# Patient Record
Sex: FEMALE | Race: BLACK OR AFRICAN AMERICAN | Employment: FULL TIME | RURAL
[De-identification: names, ages, dates, MRNs, and addresses within clinical notes are randomized per-mention and may not be internally consistent; named-entity substitution may affect disease eponyms.]

---
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
## 2022-03-18 PROBLEM — A74.9 CHLAMYDIA INFECTION: Status: ACTIVE | Noted: 2019-07-17

## 2022-03-18 PROBLEM — R63.5 WEIGHT GAIN: Status: ACTIVE | Noted: 2018-05-22

## 2022-03-20 PROBLEM — L30.0 NUMMULAR DERMATITIS: Status: ACTIVE | Noted: 2020-09-25

## 2022-04-27 ENCOUNTER — OFFICE VISIT (OUTPATIENT)
Dept: FAMILY MEDICINE CLINIC | Age: 22
End: 2022-04-27
Payer: MEDICAID

## 2022-04-27 VITALS
HEIGHT: 63 IN | RESPIRATION RATE: 20 BRPM | BODY MASS INDEX: 26.93 KG/M2 | WEIGHT: 152 LBS | TEMPERATURE: 98.2 F | OXYGEN SATURATION: 99 % | HEART RATE: 77 BPM | DIASTOLIC BLOOD PRESSURE: 62 MMHG | SYSTOLIC BLOOD PRESSURE: 105 MMHG

## 2022-04-27 DIAGNOSIS — Z02.89 ENCOUNTER FOR COMPLETION OF FORM WITH PATIENT: ICD-10-CM

## 2022-04-27 DIAGNOSIS — Z00.00 ENCOUNTER FOR PHYSICAL EXAMINATION: Primary | ICD-10-CM

## 2022-04-27 DIAGNOSIS — Z01.84 IMMUNITY STATUS TESTING: ICD-10-CM

## 2022-04-27 PROCEDURE — 99213 OFFICE O/P EST LOW 20 MIN: CPT | Performed by: NURSE PRACTITIONER

## 2022-04-27 NOTE — PROGRESS NOTES
Naveen Jane is a 25 y.o. female who presents today with c/o   Chief Complaint   Patient presents with    Physical     for rcc           Assessment/ Plan:         ICD-10-CM ICD-9-CM    1. Encounter for physical examination  Z00.00 V70.9    2. Immunity status testing  Z01.84 V72.61 QUANTIFERON-TB GOLD PLUS      HEP B SURFACE AB   3. Encounter for completion of form with patient  Z02.89 V68.89      Form started. Will call patient with results and have her RTO to  the appropriate paperwork. Orders Placed This Encounter    QUANTIFERON-TB GOLD PLUS ([facility] Default)    HEP B SURFACE AB     Standing Status:   Future     Standing Expiration Date:   4/27/2023       Follow-up and Dispositions    · Return if symptoms worsen or fail to improve. Subjective:  Naveen Jane is a 25 y.o. female here for form completion. 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 Will complete the paperwork and call her once the titers result. Past Medical History:   Diagnosis Date    Allergic rhinitis     Asthma     Otitis media     Reactive airway disease     Strep throat     Strep throat          Current Outpatient Medications:     Larissia 0.1-20 mg-mcg tab, TAKE 1 TABLET BY MOUTH EVERY DAY, Disp: , Rfl:     DM/pseudoephed/acetaminophen (VICKS DAYQUIL PO), Take  by mouth., Disp: , Rfl:     mv-min/vit C/glut/lysine/hb124 (AIRBORNE, LYSINE HCL, PO), Take  by mouth., Disp: , Rfl:     cetirizine (ZYRTEC) 10 mg tablet, Take 1 Tab by mouth nightly., Disp: 90 Tab, Rfl: 0    TRI-PREVIFEM, 28, 0.18/0.215/0.25 mg-35 mcg (28) tab, TAKE 1 TABLET BY MOUTH EVERY DAY, Disp: , Rfl: 0    No Known Allergies    History reviewed. No pertinent surgical history.     Social History     Tobacco Use   Smoking Status Never Smoker   Smokeless Tobacco Former User       Social History     Socioeconomic History    Marital status: SINGLE   Tobacco Use    Smoking status: Never Smoker    Smokeless tobacco: Former User   Substance and Sexual Activity    Alcohol use: No    Drug use: No    Sexual activity: Yes     Partners: Male     Birth control/protection: Pill   Social History Narrative    ** Merged History Encounter **            Family History   Problem Relation Age of Onset    Headache Brother     Hypertension Maternal Grandfather     Cancer Other     Diabetes Other     Headache Other     Heart Disease Other        ROS:  Review of Systems   Constitutional: Negative for chills, fever and weight loss. HENT: Negative for ear discharge, ear pain and sinus pain. Eyes: Negative for blurred vision, double vision, pain and discharge. Respiratory: Negative for cough, hemoptysis, shortness of breath and wheezing. Cardiovascular: Negative for chest pain and palpitations. Gastrointestinal: Negative for abdominal pain, diarrhea, nausea and vomiting. Genitourinary: Negative for dysuria, flank pain, frequency and hematuria. Musculoskeletal: Negative for back pain, falls, joint pain and myalgias. Skin: Negative for rash. Neurological: Negative for dizziness, speech change, loss of consciousness, weakness and headaches. Endo/Heme/Allergies: Does not bruise/bleed easily. Psychiatric/Behavioral: Negative for depression, hallucinations and suicidal ideas. Objective:     Visit Vitals  /62 (BP 1 Location: Right arm)   Pulse 77   Temp 98.2 °F (36.8 °C)   Resp 20   Ht 5' 3\" (1.6 m)   Wt 152 lb (68.9 kg)   SpO2 99%   BMI 26.93 kg/m²     Body mass index is 26.93 kg/m². Physical Exam  Vitals reviewed. Constitutional:       General: She is awake. Appearance: Normal appearance. She is well-developed, well-groomed and normal weight. She is not ill-appearing. HENT:      Head: Normocephalic and atraumatic. Right Ear: External ear normal.      Left Ear: External ear normal.      Nose: Nose normal.      Mouth/Throat:      Pharynx: Oropharynx is clear.    Eyes:      Extraocular Movements: Extraocular movements intact. Conjunctiva/sclera: Conjunctivae normal.      Pupils: Pupils are equal, round, and reactive to light. Cardiovascular:      Rate and Rhythm: Normal rate and regular rhythm. Pulses: Normal pulses. Heart sounds: Normal heart sounds. No murmur heard. No friction rub. Pulmonary:      Effort: Pulmonary effort is normal.      Breath sounds: Normal breath sounds. No wheezing. Abdominal:      General: Bowel sounds are normal.      Palpations: Abdomen is soft. Tenderness: There is no abdominal tenderness. There is no guarding. Musculoskeletal:         General: Normal range of motion. Cervical back: Normal range of motion. Skin:     General: Skin is warm and dry. Capillary Refill: Capillary refill takes less than 2 seconds. Neurological:      General: No focal deficit present. Mental Status: She is alert and oriented to person, place, and time. Mental status is at baseline. Psychiatric:         Mood and Affect: Mood normal.         Behavior: Behavior normal. Behavior is cooperative. Thought Content: Thought content normal.         Judgment: Judgment normal.               No results found for this visit on 04/27/22. Verbal and written instructions (see AVS) provided. Patient expresses understanding of diagnosis and treatment plan. Follow-up and Dispositions    · Return if symptoms worsen or fail to improve. Patient has been advised to contact practice or seek care if condition persists or worsens.      Yara Moody NP

## 2022-04-28 LAB
HBV SURFACE AB SER QL: NONREACTIVE
HBV SURFACE AB SER-ACNC: 4.58 MIU/ML

## 2022-04-28 NOTE — PROGRESS NOTES
Spoke with patients mom over the phone. Patients Anti-Hbs is <10mIU/ml, her program @ [facility] (N program) requires her to repeat three dose vaccination and redraw anti-Hbs 1-2 months after the third dose. Patient will make an appointment with the office to have this completed. Yes

## 2022-05-02 LAB
GAMMA INTERFERON BACKGROUND BLD IA-ACNC: 0.01 IU/ML
M TB IFN-G BLD-IMP: NEGATIVE
M TB IFN-G CD4+ BCKGRND COR BLD-ACNC: 0.01 IU/ML
MITOGEN IGNF BLD-ACNC: >10 IU/ML
QUANTIFERON INCUBATION, QF1T: NORMAL
QUANTIFERON TB2 AG: 0.01 IU/ML
SERVICE CMNT-IMP: NORMAL

## 2022-05-04 ENCOUNTER — OFFICE VISIT (OUTPATIENT)
Dept: FAMILY MEDICINE CLINIC | Age: 22
End: 2022-05-04

## 2022-05-04 VITALS
HEART RATE: 64 BPM | TEMPERATURE: 97.7 F | RESPIRATION RATE: 18 BRPM | SYSTOLIC BLOOD PRESSURE: 124 MMHG | HEIGHT: 63 IN | BODY MASS INDEX: 26.84 KG/M2 | WEIGHT: 151.5 LBS | DIASTOLIC BLOOD PRESSURE: 75 MMHG | OXYGEN SATURATION: 99 %

## 2022-05-04 DIAGNOSIS — Z23 ENCOUNTER FOR IMMUNIZATION: Primary | ICD-10-CM

## 2022-05-04 NOTE — PROGRESS NOTES
Katarzyna Champion is a 25 y.o. female who presents today with c/o   Chief Complaint   Patient presents with    Immunization/Injection     hep b            Assessment/ Plan:         ICD-10-CM ICD-9-CM    1. Encounter for immunization  [facility] V03.89 CANCELED: HEPATITIS B VACCINE, ADULT DOSAGE, IM     Unfortunately since she has medicaid and we are not able to perform the vaccination series with our office. I have asked her to reach out to the [facility] to have the hepatitis B vaccine series completed    No orders of the defined types were placed in this encounter. Subjective:  Katarzyna Champion is a 25 y.o. female here to have her hepatitis B vaccine series. Patients Anti-Hbs is <10mIU/ml, her program @ [facility] ([facility] program) requires her to repeat three dose vaccination and redraw anti-Hbs 1-2 months after the third dose. Patient will make an appointment with the office to have this completed. She arrived today to have this completed, but unfortunately we are not able to perform her vaccination here which I was not aware of at the time I had her make the appointment. Patient Active Problem List   Diagnosis Code    Weight gain R63.5    Chlamydia infection A74.9    Nummular dermatitis L30.0       Past Medical History:   Diagnosis Date    Allergic rhinitis     Asthma     Otitis media     Reactive airway disease     Strep throat     Strep throat          Current Outpatient Medications:     Larissia 0.1-20 mg-mcg tab, TAKE 1 TABLET BY MOUTH EVERY DAY, Disp: , Rfl:     DM/pseudoephed/acetaminophen (VICKS DAYQUIL PO), Take  by mouth., Disp: , Rfl:     mv-min/vit C/glut/lysine/hb124 (AIRBORNE, LYSINE HCL, PO), Take  by mouth., Disp: , Rfl:     cetirizine (ZYRTEC) 10 mg tablet, Take 1 Tab by mouth nightly., Disp: 90 Tab, Rfl: 0    TRI-PREVIFEM, 28, 0.18/0.215/0.25 mg-35 mcg (28) tab, TAKE 1 TABLET BY MOUTH EVERY DAY, Disp: , Rfl: 0    No Known Allergies    History reviewed.  No pertinent surgical history. Social History     Tobacco Use   Smoking Status Never Smoker   Smokeless Tobacco Former User       Social History     Socioeconomic History    Marital status: SINGLE   Tobacco Use    Smoking status: Never Smoker    Smokeless tobacco: Former User   Substance and Sexual Activity    Alcohol use: No    Drug use: No    Sexual activity: Yes     Partners: Male     Birth control/protection: Pill   Social History Narrative    ** Merged History Encounter **            Family History   Problem Relation Age of Onset    Headache Brother     Hypertension Maternal Grandfather     Cancer Other     Diabetes Other     Headache Other     Heart Disease Other        ROS:  Review of Systems   Constitutional: Negative for chills, fever and weight loss. HENT: Negative for ear discharge, ear pain and sinus pain. Eyes: Negative for blurred vision, double vision, pain and discharge. Respiratory: Negative for cough, hemoptysis, shortness of breath and wheezing. Cardiovascular: Negative for chest pain and palpitations. Gastrointestinal: Negative for abdominal pain, diarrhea, nausea and vomiting. Genitourinary: Negative for dysuria, flank pain, frequency and hematuria. Musculoskeletal: Negative for back pain, falls, joint pain and myalgias. Skin: Negative for rash. Neurological: Negative for dizziness, speech change, loss of consciousness, weakness and headaches. Endo/Heme/Allergies: Does not bruise/bleed easily. Psychiatric/Behavioral: Negative for depression, hallucinations and suicidal ideas. Objective:     Visit Vitals  /75 (BP 1 Location: Left upper arm)   Pulse 64   Temp 97.7 °F (36.5 °C) (Temporal)   Resp 18   Ht 5' 3\" (1.6 m)   Wt 151 lb 8 oz (68.7 kg)   LMP 04/08/2022   SpO2 99%   BMI 26.84 kg/m²     Body mass index is 26.84 kg/m². Physical Exam  Vitals reviewed. HENT:      Head: Normocephalic.       Right Ear: External ear normal.      Left Ear: External ear normal. Nose: Nose normal.      Mouth/Throat:      Mouth: Mucous membranes are moist.   Eyes:      Pupils: Pupils are equal, round, and reactive to light. Cardiovascular:      Rate and Rhythm: Normal rate. Pulses: Normal pulses. Pulmonary:      Effort: Pulmonary effort is normal.      Breath sounds: Normal breath sounds. Abdominal:      General: Abdomen is flat. Musculoskeletal:         General: Normal range of motion. Cervical back: Normal range of motion. Skin:     General: Skin is warm and dry. Neurological:      Mental Status: She is alert and oriented to person, place, and time. Psychiatric:         Mood and Affect: Mood normal.         Behavior: Behavior normal.         Thought Content: Thought content normal.           Results for orders placed or performed in visit on 04/27/22   QUANTIFERON-TB GOLD PLUS   Result Value Ref Range    QuantiFERON Incubation Incubation performed. QuantiFERON Criteria Comment     QuantiFERON TB1 Ag 0.01 IU/mL    QuantiFERON TB2 Ag 0.01 IU/mL    QuantiFERON Nil Value 0.01 IU/mL    QuantiFERON Mitogen Value >10.00 IU/mL    QuantiFERON Plus Negative Negative   HEP B SURFACE AB   Result Value Ref Range    Hepatitis B surface Ab 4.58 mIU/mL    Hep B surface Ab Interp. NONREACTIVE NONREACTIVE         No results found for this visit on 05/04/22. Verbal and written instructions (see AVS) provided. Patient expresses understanding of diagnosis and treatment plan. Patient has been advised to contact practice or seek care if condition persists or worsens.      Mita Torres NP

## 2022-05-04 NOTE — PROGRESS NOTES
1. \"Have you been to the ER, urgent care clinic since your last visit? Hospitalized since your last visit? \" No    2. \"Have you seen or consulted any other health care providers outside of the [address] [place] [address] since your last visit? \" No     3. For patients aged 39-70: Has the patient had a colonoscopy / FIT/ Cologuard? NA - based on age      If the patient is female:    4. For patients aged 41-77: Has the patient had a mammogram within the past 2 years? NA - based on age or sex      11. For patients aged 21-65: Has the patient had a pap smear?  Yes - no Care Gap present

## 2022-07-05 ENCOUNTER — OFFICE VISIT (OUTPATIENT)
Dept: FAMILY MEDICINE CLINIC | Age: 22
End: 2022-07-05
Payer: MEDICAID

## 2022-07-05 VITALS
HEART RATE: 73 BPM | HEIGHT: 63 IN | SYSTOLIC BLOOD PRESSURE: 116 MMHG | WEIGHT: 136.6 LBS | DIASTOLIC BLOOD PRESSURE: 63 MMHG | BODY MASS INDEX: 24.2 KG/M2 | OXYGEN SATURATION: 97 % | TEMPERATURE: 98.6 F | RESPIRATION RATE: 20 BRPM

## 2022-07-05 DIAGNOSIS — L02.91 ABSCESS: Primary | ICD-10-CM

## 2022-07-05 PROCEDURE — 99213 OFFICE O/P EST LOW 20 MIN: CPT | Performed by: NURSE PRACTITIONER

## 2022-07-05 RX ORDER — AMOXICILLIN AND CLAVULANATE POTASSIUM 875; 125 MG/1; MG/1
1 TABLET, FILM COATED ORAL 2 TIMES DAILY
Qty: 20 TABLET | Refills: 0 | Status: SHIPPED | OUTPATIENT
Start: 2022-07-05 | End: 2022-07-07 | Stop reason: SINTOL

## 2022-07-05 NOTE — PATIENT INSTRUCTIONS
Perineal Abscess: Care Instructions  Your Care Instructions  A perineal abscess is an infection that causes a painful lump in the perineum. The perineum is the area between the scrotum and the anus in a man. In a woman, it's the area between the vulva and the anus. The area may look red and feel painful and be swollen. The abscess may form after surgery or after delivery of a baby. It can also be caused by an infection of the prostate gland. The prostate gland is an organ found inside a man's body, just below the bladder. Your doctor may have done minor surgery to open and drain the abscess. You may have had a sedative to help you relax. You may be unsteady after having sedation. It can take a few hours for the medicine's effects to wear off. Common side effects include nausea, vomiting, and feeling sleepy or tired. The doctor has checked you carefully, but problems can develop later. If you notice any problems or new symptoms, get medical treatment right away. Follow-up care is a key part of your treatment and safety. Be sure to make and go to all appointments, and call your doctor if you are having problems. It's also a good idea to know your test results and keep a list of the medicines you take. How can you care for yourself at home? · If your doctor gave you a sedative:  ? For 24 hours, don't do anything that requires attention to detail. This includes going to work, making important decisions, or signing any legal documents. It takes time for the medicine's effects to completely wear off.  ? For your safety, do not drive or operate any machinery that could be dangerous. Wait until the medicine wears off. You need to be able to think clearly and react easily. · Be safe with medicines. Read and follow all instructions on the label. ? If the doctor gave you a prescription medicine for pain, take it as prescribed.   ? If you are not taking a prescription pain medicine, ask your doctor if you can take an over-the-counter medicine. · If your doctor prescribed antibiotics, take them as directed. Do not stop taking them just because you feel better. You need to take the full course of antibiotics. · Sit in a few inches of warm water (sitz bath) 3 times a day and after bowel movements. The warm water helps the area heal and eases discomfort. When should you call for help? Call 911 anytime you think you may need emergency care. For example, call if:    · You have trouble breathing.     · You passed out (lost consciousness). Call your doctor now or seek immediate medical care if:    · You have symptoms of infection, such as:  ? Increased pain, swelling, warmth, or redness. ? Red streaks leading from the area. ? Pus draining from the area. ? A fever. Watch closely for changes in your health, and be sure to contact your doctor if:    · You do not get better as expected. Where can you learn more? Go to http://www.gray.com/  Enter J195 in the search box to learn more about \"Perineal Abscess: Care Instructions. \"  Current as of: September 8, 2021               Content Version: 13.2  © 7410-9604 [employer]. Care instructions adapted under license by [employer] (which disclaims liability or warranty for this information). If you have questions about a medical condition or this instruction, always ask your healthcare professional. [place] [address] any warranty or liability for your use of this information.

## 2022-07-05 NOTE — PROGRESS NOTES
Enrico Oshea is a 25 y.o. female who presents today with c/o   Chief Complaint   Patient presents with    Skin Problem     genital area       Assessment/ Plan:         ICD-10-CM ICD-9-CM    1. Abscess  L02.91 682. 9      She was unable to tolerate draining the abscess in the office today. We were not able to numb the area due to discomfort- I was unable to insert the needle to administer lidocaine. Will start her on antibiotics and have her RTO in 3 days for re-evaluation  Will add clindamycin on her follow up appt if needed    Orders Placed This Encounter    amoxicillin-clavulanate (AUGMENTIN) 875-125 mg per tablet     Sig: Take 1 Tablet by mouth two (2) times a day for 10 days. Dispense:  20 Tablet     Refill:  0       Follow-up and Dispositions    · Return in about 3 days (around 7/8/2022). Subjective:  Enrico Oshea is a 25 y.o. female here today for 'boil.' She states that over the last three days she started having pain to the left side of her perineum. Hurts to sit down and she has noticed purulent drainage in her underwear. States the drainage also has an odor. She denies fever at home. Patient Active Problem List   Diagnosis Code    Weight gain R63.5    Chlamydia infection A74.9    Nummular dermatitis L30.0       Past Medical History:   Diagnosis Date    Allergic rhinitis     Asthma     Otitis media     Reactive airway disease     Strep throat     Strep throat          Current Outpatient Medications:     amoxicillin-clavulanate (AUGMENTIN) 875-125 mg per tablet, Take 1 Tablet by mouth two (2) times a day for 10 days. , Disp: 20 Tablet, Rfl: 0    Larissia 0.1-20 mg-mcg tab, TAKE 1 TABLET BY MOUTH EVERY DAY, Disp: , Rfl:     DM/pseudoephed/acetaminophen (VICKS DAYQUIL PO), Take  by mouth., Disp: , Rfl:     mv-min/vit C/glut/lysine/hb124 (AIRBORNE, LYSINE HCL, PO), Take  by mouth., Disp: , Rfl:     cetirizine (ZYRTEC) 10 mg tablet, Take 1 Tab by mouth nightly., Disp: 90 Tab, Rfl: 0    TRI-PREVIFEM, 28, 0.18/0.215/0.25 mg-35 mcg (28) tab, TAKE 1 TABLET BY MOUTH EVERY DAY, Disp: , Rfl: 0    No Known Allergies    History reviewed. No pertinent surgical history. Social History     Tobacco Use   Smoking Status Never Smoker   Smokeless Tobacco Former User       Social History     Socioeconomic History    Marital status: SINGLE   Tobacco Use    Smoking status: Never Smoker    Smokeless tobacco: Former User   Substance and Sexual Activity    Alcohol use: No    Drug use: No    Sexual activity: Yes     Partners: Male     Birth control/protection: Pill   Social History Narrative    ** Merged History Encounter **            Family History   Problem Relation Age of Onset    Headache Brother     Hypertension Maternal Grandfather     Cancer Other     Diabetes Other     Headache Other     Heart Disease Other        ROS:  Review of Systems   Constitutional: Negative for chills, fever and malaise/fatigue. HENT: Negative for hearing loss. Eyes: Negative for blurred vision and double vision. Respiratory: Negative for cough. Cardiovascular: Negative for chest pain and palpitations. Gastrointestinal: Negative for abdominal pain, heartburn, nausea and vomiting. Genitourinary:        Abscess noted to left perineum   Musculoskeletal: Negative for myalgias. Skin: Negative for rash. Neurological: Negative for dizziness, tingling and headaches. Psychiatric/Behavioral: Negative for depression. Objective:     Visit Vitals  /63 (BP 1 Location: Left arm)   Pulse 73   Temp 98.6 °F (37 °C)   Resp 20   Ht 5' 3\" (1.6 m)   Wt 136 lb 9.6 oz (62 kg)   SpO2 97%   BMI 24.20 kg/m²     Body mass index is 24.2 kg/m². Physical Exam  Vitals reviewed. HENT:      Head: Normocephalic.       Right Ear: External ear normal.      Left Ear: External ear normal.      Nose: Nose normal.      Mouth/Throat:      Mouth: Mucous membranes are moist.   Eyes:      Extraocular Movements: Extraocular movements intact. Conjunctiva/sclera: Conjunctivae normal.      Pupils: Pupils are equal, round, and reactive to light. Cardiovascular:      Rate and Rhythm: Normal rate. Pulses: Normal pulses. Pulmonary:      Effort: Pulmonary effort is normal.   Abdominal:      General: Abdomen is flat. Genitourinary:      Musculoskeletal:         General: Normal range of motion. Cervical back: Normal range of motion. Neurological:      Mental Status: She is alert and oriented to person, place, and time. Results for orders placed or performed in visit on 04/27/22   QUANTIFERON-TB GOLD PLUS   Result Value Ref Range    QuantiFERON Incubation Incubation performed. QuantiFERON Criteria Comment     QuantiFERON TB1 Ag 0.01 IU/mL    QuantiFERON TB2 Ag 0.01 IU/mL    QuantiFERON Nil Value 0.01 IU/mL    QuantiFERON Mitogen Value >10.00 IU/mL    QuantiFERON Plus Negative Negative   HEP B SURFACE AB   Result Value Ref Range    Hepatitis B surface Ab 4.58 mIU/mL    Hep B surface Ab Interp. NONREACTIVE NONREACTIVE         No results found for this visit on 07/05/22. Verbal and written instructions (see AVS) provided. Patient expresses understanding of diagnosis and treatment plan. Follow-up and Dispositions    · Return in about 3 days (around 7/8/2022). Patient has been advised to contact practice or seek care if condition persists or worsens.      Pedrito Taylor NP

## 2022-07-05 NOTE — PROGRESS NOTES
1. Have you been to the ER, urgent care clinic since your last visit? Hospitalized since your last visit?no    2. Have you seen or consulted any other health care providers outside of the [address] [place] [address] since your last visit? Include any pap smears or colon screening.  no

## 2022-07-07 ENCOUNTER — DOCUMENTATION ONLY (OUTPATIENT)
Dept: FAMILY MEDICINE CLINIC | Age: 22
End: 2022-07-07

## 2022-07-07 ENCOUNTER — TELEPHONE (OUTPATIENT)
Dept: FAMILY MEDICINE CLINIC | Age: 22
End: 2022-07-07

## 2022-07-07 RX ORDER — CLINDAMYCIN HYDROCHLORIDE 300 MG/1
300 CAPSULE ORAL 3 TIMES DAILY
Qty: 30 CAPSULE | Refills: 0 | Status: SHIPPED | OUTPATIENT
Start: 2022-07-07 | End: 2022-07-11

## 2022-07-07 NOTE — TELEPHONE ENCOUNTER
----- Message from Nalini Urias sent at 7/7/2022  8:24 AM EDT -----  Subject: Message to Provider    QUESTIONS  Information for Provider? Pt called in and stated that pt was sick   yesterday evening and did not know if it is from the antibiotic did take   it that morning and was fine but that evening did not feel well . Pt did   not go to school today felt very uncomfortable . And was told by provider   if it did not get any better to give provider a call pt do have a   appointment on Friday but did not know what to do .  ---------------------------------------------------------------------------  --------------  [id number]  [phone]; Do not leave any message, patient will call back for answer  ---------------------------------------------------------------------------  --------------  SCRIPT ANSWERS  Relationship to Patient?  Self

## 2022-07-07 NOTE — TELEPHONE ENCOUNTER
Pt stated she didn't take the meds today. She stated last night she had cold sweats, vomiting & dizziness and wanted to talk to you about it.  Pt was made aware that you were send in  A new antibiotic

## 2022-07-07 NOTE — PROGRESS NOTES
Patient states she felt sick taking her antibiotic--will change antibiotic and have her follow up in 3 days in the office. She will go to the ER over the weekend if she develops a fever/worsening pain/chills.

## 2022-07-07 NOTE — PROGRESS NOTES
Nina Acosta is a 25 y.o. female who presents today with c/o   Chief Complaint   Patient presents with    Follow-up     boil       Assessment/ Plan:         ICD-10-CM ICD-9-CM    1. Abscess  L02.91 682.9      Continue antibiotic and follow up in the office in one week    Orders Placed This Encounter    amoxicillin-clavulanate (AUGMENTIN) 875-125 mg per tablet     Sig: Take 1 Tablet by mouth two (2) times a day for 10 days. Dispense:  20 Tablet     Refill:  0       Follow-up and Dispositions    · Return in about 1 week (around 7/18/2022). Subjective:  Nina Acosta is a 25 y.o. female here today for follow-up on her boil. Seen last week in the office and I was unable to drain the abscess so she was placed on Augmentin. Today, she is still having discomfort. She denies fevers at home. The abscess has been draining purulent drainage. She has been taking motrin for her pain which is also helping. Patient Active Problem List   Diagnosis Code    Weight gain R63.5    Chlamydia infection A74.9    Nummular dermatitis L30.0       Past Medical History:   Diagnosis Date    Allergic rhinitis     Asthma     Otitis media     Reactive airway disease     Strep throat     Strep throat          Current Outpatient Medications:     amoxicillin-clavulanate (AUGMENTIN) 875-125 mg per tablet, Take 1 Tablet by mouth two (2) times a day for 10 days. , Disp: 20 Tablet, Rfl: 0    Larissia 0.1-20 mg-mcg tab, TAKE 1 TABLET BY MOUTH EVERY DAY, Disp: , Rfl:     DM/pseudoephed/acetaminophen (VICKS DAYQUIL PO), Take  by mouth., Disp: , Rfl:     mv-min/vit C/glut/lysine/hb124 (AIRBORNE, LYSINE HCL, PO), Take  by mouth., Disp: , Rfl:     cetirizine (ZYRTEC) 10 mg tablet, Take 1 Tab by mouth nightly., Disp: 90 Tab, Rfl: 0    TRI-PREVIFEM, 28, 0.18/0.215/0.25 mg-35 mcg (28) tab, TAKE 1 TABLET BY MOUTH EVERY DAY, Disp: , Rfl: 0    No Known Allergies    History reviewed. No pertinent surgical history.     Social History     Tobacco Use   Smoking Status Never Smoker   Smokeless Tobacco Former User       Social History     Socioeconomic History    Marital status: SINGLE   Tobacco Use    Smoking status: Never Smoker    Smokeless tobacco: Former User   Substance and Sexual Activity    Alcohol use: No    Drug use: No    Sexual activity: Yes     Partners: Male     Birth control/protection: Pill   Social History Narrative    ** Merged History Encounter **            Family History   Problem Relation Age of Onset    Headache Brother     Hypertension Maternal Grandfather     Cancer Other     Diabetes Other     Headache Other     Heart Disease Other        ROS:  Review of Systems   Constitutional: Negative for chills, fever and malaise/fatigue. HENT: Negative for hearing loss. Eyes: Negative for blurred vision and double vision. Respiratory: Negative for cough. Cardiovascular: Negative for chest pain and palpitations. Gastrointestinal: Negative for abdominal pain, heartburn, nausea and vomiting. Genitourinary:        Abscess noted to left perineum   Musculoskeletal: Negative for myalgias. Skin: Negative for rash. Neurological: Negative for dizziness, tingling and headaches. Psychiatric/Behavioral: Negative for depression. Objective:     Visit Vitals  /65 (BP 1 Location: Left upper arm)   Pulse 62   Temp 97.8 °F (36.6 °C) (Temporal)   Resp 18   Ht 5' 3\" (1.6 m)   Wt 137 lb 4 oz (62.3 kg)   LMP 07/01/2022   SpO2 98%   BMI 24.31 kg/m²     Body mass index is 24.31 kg/m². Physical Exam  Vitals reviewed. HENT:      Head: Normocephalic. Right Ear: External ear normal.      Left Ear: External ear normal.      Nose: Nose normal.      Mouth/Throat:      Mouth: Mucous membranes are moist.   Eyes:      Extraocular Movements: Extraocular movements intact. Conjunctiva/sclera: Conjunctivae normal.      Pupils: Pupils are equal, round, and reactive to light.    Cardiovascular:      Rate and Rhythm: Normal rate. Pulses: Normal pulses. Pulmonary:      Effort: Pulmonary effort is normal.   Abdominal:      General: Abdomen is flat. Genitourinary:      Musculoskeletal:         General: Normal range of motion. Cervical back: Normal range of motion. Neurological:      Mental Status: She is alert and oriented to person, place, and time. Results for orders placed or performed in visit on 04/27/22   QUANTIFERON-TB GOLD PLUS   Result Value Ref Range    QuantiFERON Incubation Incubation performed. QuantiFERON Criteria Comment     QuantiFERON TB1 Ag 0.01 IU/mL    QuantiFERON TB2 Ag 0.01 IU/mL    QuantiFERON Nil Value 0.01 IU/mL    QuantiFERON Mitogen Value >10.00 IU/mL    QuantiFERON Plus Negative Negative   HEP B SURFACE AB   Result Value Ref Range    Hepatitis B surface Ab 4.58 mIU/mL    Hep B surface Ab Interp. NONREACTIVE NONREACTIVE         No results found for this visit on 07/11/22. Verbal and written instructions (see AVS) provided. Patient expresses understanding of diagnosis and treatment plan. Follow-up and Dispositions    · Return in about 1 week (around 7/18/2022). Patient has been advised to contact practice or seek care if condition persists or worsens.      Marcelle Flores NP

## 2022-07-11 ENCOUNTER — OFFICE VISIT (OUTPATIENT)
Dept: FAMILY MEDICINE CLINIC | Age: 22
End: 2022-07-11
Payer: MEDICAID

## 2022-07-11 VITALS
TEMPERATURE: 97.8 F | BODY MASS INDEX: 24.32 KG/M2 | OXYGEN SATURATION: 98 % | WEIGHT: 137.25 LBS | HEIGHT: 63 IN | HEART RATE: 62 BPM | SYSTOLIC BLOOD PRESSURE: 116 MMHG | DIASTOLIC BLOOD PRESSURE: 65 MMHG | RESPIRATION RATE: 18 BRPM

## 2022-07-11 DIAGNOSIS — L02.91 ABSCESS: Primary | ICD-10-CM

## 2022-07-11 PROCEDURE — 99213 OFFICE O/P EST LOW 20 MIN: CPT | Performed by: NURSE PRACTITIONER

## 2022-07-11 RX ORDER — AMOXICILLIN AND CLAVULANATE POTASSIUM 875; 125 MG/1; MG/1
1 TABLET, FILM COATED ORAL 2 TIMES DAILY
Qty: 20 TABLET | Refills: 0
Start: 2022-07-11 | End: 2022-07-18 | Stop reason: ALTCHOICE

## 2022-07-11 NOTE — PROGRESS NOTES
1. \"Have you been to the ER, urgent care clinic since your last visit? Hospitalized since your last visit? \" No    2. \"Have you seen or consulted any other health care providers outside of the [address] [place] [address] since your last visit? \" No     3. For patients aged 39-70: Has the patient had a colonoscopy / FIT/ Cologuard? NA - based on age      If the patient is female:    4. For patients aged 41-77: Has the patient had a mammogram within the past 2 years? NA - based on age or sex      11. For patients aged 21-65: Has the patient had a pap smear?  Yes - no Care Gap present

## 2022-07-14 NOTE — PROGRESS NOTES
Catracho Sarabia is a 25 y.o. female who presents today with c/o   Chief Complaint   Patient presents with    Cyst       Assessment/ Plan:         ICD-10-CM ICD-9-CM    1. Infected cyst of Bartholin's gland duct  N75.0 616.2    2. Yeast infection of the vagina  B37.3 112.1      [facility] with Dr. Linda Peraza for recommendations. Appointment scheduled this Thursday at [facility] for the patient. Start taking diflucan as ordered    Orders Placed This Encounter    fluconazole (DIFLUCAN) 150 mg tablet     Sig: Take 1 Tablet by mouth daily for 1 day. FDA advises cautious prescribing of oral fluconazole in pregnancy. Dispense:  1 Tablet     Refill:  0       Follow-up and Dispositions    · Return if symptoms worsen or fail to improve. Follow-up and Disposition History         Subjective:  Catracho Sarabia is a 25 y.o. female here today for follow-up on her cyst. Seen initially 7/5/22 for the same. We were unable to drain her cyst in the office and she was placed on Augmentin. Today, she is still having discomfort. The area is still draining purulent drainage. Progressively improving. She denies fevers at home. She has been keeping the area clean with soap and water daily. Yeast infection  Reports vaginal itching. States she noticed the itching after taking the antibiotic a few weeks ago. Patient Active Problem List   Diagnosis Code    Weight gain R63.5    Chlamydia infection A74.9    Nummular dermatitis L30.0       Past Medical History:   Diagnosis Date    Allergic rhinitis     Asthma     Otitis media     Reactive airway disease     Strep throat     Strep throat          Current Outpatient Medications:     fluconazole (DIFLUCAN) 150 mg tablet, Take 1 Tablet by mouth daily for 1 day. FDA advises cautious prescribing of oral fluconazole in pregnancy. , Disp: 1 Tablet, Rfl: 0    Larissia 0.1-20 mg-mcg tab, TAKE 1 TABLET BY MOUTH EVERY DAY, Disp: , Rfl:    DM/pseudoephed/acetaminophen (VICKS DAYQUIL PO), Take  by mouth., Disp: , Rfl:     mv-min/vit C/glut/lysine/hb124 (AIRBORNE, LYSINE HCL, PO), Take  by mouth., Disp: , Rfl:     cetirizine (ZYRTEC) 10 mg tablet, Take 1 Tab by mouth nightly., Disp: 90 Tab, Rfl: 0    TRI-PREVIFEM, 28, 0.18/0.215/0.25 mg-35 mcg (28) tab, TAKE 1 TABLET BY MOUTH EVERY DAY, Disp: , Rfl: 0    No Known Allergies    History reviewed. No pertinent surgical history. Social History     Tobacco Use   Smoking Status Never Smoker   Smokeless Tobacco Former User       Social History     Socioeconomic History    Marital status: SINGLE   Tobacco Use    Smoking status: Never Smoker    Smokeless tobacco: Former User   Substance and Sexual Activity    Alcohol use: No    Drug use: No    Sexual activity: Yes     Partners: Male     Birth control/protection: Pill   Social History Narrative    ** Merged History Encounter **            Family History   Problem Relation Age of Onset    Headache Brother     Hypertension Maternal Grandfather     Cancer Other     Diabetes Other     Headache Other     Heart Disease Other        ROS:  Review of Systems   Constitutional: Negative for chills, fever and malaise/fatigue. HENT: Negative for hearing loss. Eyes: Negative for blurred vision and double vision. Respiratory: Negative for cough. Cardiovascular: Negative for chest pain and palpitations. Gastrointestinal: Negative for abdominal pain, heartburn, nausea and vomiting. Genitourinary:        Cyst noted to left perineum  +vaginal itching   Musculoskeletal: Negative for myalgias. Skin: Negative for rash. Neurological: Negative for dizziness, tingling and headaches. Psychiatric/Behavioral: Negative for depression.          Objective:     Visit Vitals  /63 (BP 1 Location: Left upper arm)   Pulse 72   Temp 98 °F (36.7 °C) (Temporal)   Resp 18   Ht 5' 3\" (1.6 m)   Wt 138 lb 4 oz (62.7 kg)   LMP 07/01/2022   SpO2 98%   BMI 24.49 kg/m²     Body mass index is 24.49 kg/m². Physical Exam  Vitals reviewed. HENT:      Head: Normocephalic. Right Ear: External ear normal.      Left Ear: External ear normal.      Nose: Nose normal.      Mouth/Throat:      Mouth: Mucous membranes are moist.   Eyes:      Extraocular Movements: Extraocular movements intact. Conjunctiva/sclera: Conjunctivae normal.      Pupils: Pupils are equal, round, and reactive to light. Cardiovascular:      Rate and Rhythm: Normal rate. Pulses: Normal pulses. Pulmonary:      Effort: Pulmonary effort is normal.   Abdominal:      General: Abdomen is flat. Genitourinary:      Musculoskeletal:         General: Normal range of motion. Cervical back: Normal range of motion. Neurological:      Mental Status: She is alert and oriented to person, place, and time. No results found for this visit on 07/18/22. Verbal and written instructions (see AVS) provided. Patient expresses understanding of diagnosis and treatment plan. Follow-up and Dispositions    · Return if symptoms worsen or fail to improve. Follow-up and Disposition History            Patient has been advised to contact practice or seek care if condition persists or worsens.      Dominique Santos NP

## 2022-07-18 ENCOUNTER — OFFICE VISIT (OUTPATIENT)
Dept: FAMILY MEDICINE CLINIC | Age: 22
End: 2022-07-18
Payer: MEDICAID

## 2022-07-18 VITALS
BODY MASS INDEX: 24.5 KG/M2 | RESPIRATION RATE: 18 BRPM | TEMPERATURE: 98 F | SYSTOLIC BLOOD PRESSURE: 104 MMHG | WEIGHT: 138.25 LBS | DIASTOLIC BLOOD PRESSURE: 63 MMHG | OXYGEN SATURATION: 98 % | HEIGHT: 63 IN | HEART RATE: 72 BPM

## 2022-07-18 DIAGNOSIS — B37.31 YEAST INFECTION OF THE VAGINA: ICD-10-CM

## 2022-07-18 DIAGNOSIS — N75.0 INFECTED CYST OF BARTHOLIN'S GLAND DUCT: Primary | ICD-10-CM

## 2022-07-18 PROCEDURE — 99213 OFFICE O/P EST LOW 20 MIN: CPT | Performed by: NURSE PRACTITIONER

## 2022-07-18 RX ORDER — FLUCONAZOLE 150 MG/1
150 TABLET ORAL DAILY
Qty: 1 TABLET | Refills: 0 | Status: SHIPPED | OUTPATIENT
Start: 2022-07-18 | End: 2022-07-19

## 2022-07-18 NOTE — PROGRESS NOTES
1. \"Have you been to the ER, urgent care clinic since your last visit? Hospitalized since your last visit? \" No    2. \"Have you seen or consulted any other health care providers outside of the [address] [place] [address] since your last visit? \" No     3. For patients aged 39-70: Has the patient had a colonoscopy / FIT/ Cologuard? NA - based on age      If the patient is female:    4. For patients aged 41-77: Has the patient had a mammogram within the past 2 years? NA - based on age or sex      11. For patients aged 21-65: Has the patient had a pap smear?  Yes - no Care Gap present

## 2022-08-30 NOTE — PROGRESS NOTES
New Patient, Referred by Dr. Francy Luna for vulvar mass    1. Have you been to the ER, urgent care clinic since your last visit? Hospitalized since your last visit?  no    2. Have you seen or consulted any other health care providers outside of the [address] [place] [address] since your last visit? Include any pap smears or colon screening.    Yes, Dr. Francy Luna

## 2022-08-31 ENCOUNTER — OFFICE VISIT (OUTPATIENT)
Dept: GYNECOLOGY | Age: 22
End: 2022-08-31
Payer: MEDICAID

## 2022-08-31 VITALS
BODY MASS INDEX: 24.38 KG/M2 | WEIGHT: 137.6 LBS | HEART RATE: 55 BPM | SYSTOLIC BLOOD PRESSURE: 101 MMHG | HEIGHT: 63 IN | DIASTOLIC BLOOD PRESSURE: 58 MMHG

## 2022-08-31 DIAGNOSIS — N90.89 VULVAR LESION: Primary | ICD-10-CM

## 2022-08-31 PROCEDURE — 99204 OFFICE O/P NEW MOD 45 MIN: CPT | Performed by: OBSTETRICS & GYNECOLOGY

## 2022-08-31 RX ORDER — METRONIDAZOLE 500 MG/1
TABLET ORAL
COMMUNITY
Start: 2022-08-24 | End: 2022-10-24

## 2022-08-31 NOTE — LETTER
9/2/2022    Patient: Catracho Sarabia   YOB: 2000   Date of Visit: 8/31/2022     Belkys England NP  [address] [address]  Via In Brandie Espinoza MD  [facility] [address]  [place] [address]  Via Fax: [phone]    Dear MARIANNE Garza MD,      Thank you for referring Ms. Gabby Varela to [facility] for evaluation. My notes for this consultation are attached. If you have questions, please do not hesitate to call me. I look forward to following your patient along with you.       Sincerely,    Blaine Sierra MD

## 2022-09-02 ENCOUNTER — ANESTHESIA EVENT (OUTPATIENT)
Dept: SURGERY | Age: 22
End: 2022-09-02
Payer: MEDICAID

## 2022-09-02 ENCOUNTER — ANESTHESIA (OUTPATIENT)
Dept: SURGERY | Age: 22
End: 2022-09-02
Payer: MEDICAID

## 2022-09-02 ENCOUNTER — HOSPITAL ENCOUNTER (OUTPATIENT)
Age: 22
Setting detail: OUTPATIENT SURGERY
Discharge: HOME | End: 2022-09-02
Attending: OBSTETRICS & GYNECOLOGY | Admitting: OBSTETRICS & GYNECOLOGY
Payer: MEDICAID

## 2022-09-02 VITALS
TEMPERATURE: 97.6 F | OXYGEN SATURATION: 100 % | WEIGHT: 137 LBS | DIASTOLIC BLOOD PRESSURE: 75 MMHG | BODY MASS INDEX: 24.27 KG/M2 | HEART RATE: 59 BPM | RESPIRATION RATE: 14 BRPM | HEIGHT: 63 IN | SYSTOLIC BLOOD PRESSURE: 120 MMHG

## 2022-09-02 PROBLEM — N90.89 VULVAR LESION: Status: ACTIVE | Noted: 2022-09-02

## 2022-09-02 LAB — HCG UR QL: NEGATIVE

## 2022-09-02 PROCEDURE — 74011250636 HC RX REV CODE- 250/636: Performed by: OBSTETRICS & GYNECOLOGY

## 2022-09-02 PROCEDURE — 81025 URINE PREGNANCY TEST: CPT

## 2022-09-02 PROCEDURE — 77030010509 HC AIRWY LMA MSK TELE -A: Performed by: ANESTHESIOLOGY

## 2022-09-02 PROCEDURE — 77030040361 HC SLV COMPR DVT MDII -B: Performed by: OBSTETRICS & GYNECOLOGY

## 2022-09-02 PROCEDURE — 74011250636 HC RX REV CODE- 250/636: Performed by: ANESTHESIOLOGY

## 2022-09-02 PROCEDURE — 77030040922 HC BLNKT HYPOTHRM STRY -A

## 2022-09-02 PROCEDURE — 77030031139 HC SUT VCRL2 J&J -A: Performed by: OBSTETRICS & GYNECOLOGY

## 2022-09-02 PROCEDURE — 2709999900 HC NON-CHARGEABLE SUPPLY: Performed by: OBSTETRICS & GYNECOLOGY

## 2022-09-02 PROCEDURE — 88309 TISSUE EXAM BY PATHOLOGIST: CPT

## 2022-09-02 PROCEDURE — 74011000250 HC RX REV CODE- 250: Performed by: OBSTETRICS & GYNECOLOGY

## 2022-09-02 PROCEDURE — 88305 TISSUE EXAM BY PATHOLOGIST: CPT

## 2022-09-02 PROCEDURE — 74011250636 HC RX REV CODE- 250/636: Performed by: NURSE ANESTHETIST, CERTIFIED REGISTERED

## 2022-09-02 PROCEDURE — 76210000020 HC REC RM PH II FIRST 0.5 HR: Performed by: OBSTETRICS & GYNECOLOGY

## 2022-09-02 PROCEDURE — 77030011283 HC ELECTRD NDL COVD -A: Performed by: OBSTETRICS & GYNECOLOGY

## 2022-09-02 PROCEDURE — 74011000250 HC RX REV CODE- 250: Performed by: NURSE ANESTHETIST, CERTIFIED REGISTERED

## 2022-09-02 PROCEDURE — 76060000033 HC ANESTHESIA 1 TO 1.5 HR: Performed by: OBSTETRICS & GYNECOLOGY

## 2022-09-02 PROCEDURE — 76010000149 HC OR TIME 1 TO 1.5 HR: Performed by: OBSTETRICS & GYNECOLOGY

## 2022-09-02 PROCEDURE — 76210000063 HC OR PH I REC FIRST 0.5 HR: Performed by: OBSTETRICS & GYNECOLOGY

## 2022-09-02 PROCEDURE — 74011250637 HC RX REV CODE- 250/637: Performed by: ANESTHESIOLOGY

## 2022-09-02 RX ORDER — MIDAZOLAM HYDROCHLORIDE 1 MG/ML
INJECTION, SOLUTION INTRAMUSCULAR; INTRAVENOUS AS NEEDED
Status: DISCONTINUED | OUTPATIENT
Start: 2022-09-02 | End: 2022-09-02 | Stop reason: HOSPADM

## 2022-09-02 RX ORDER — DEXMEDETOMIDINE HYDROCHLORIDE 100 UG/ML
INJECTION, SOLUTION INTRAVENOUS AS NEEDED
Status: DISCONTINUED | OUTPATIENT
Start: 2022-09-02 | End: 2022-09-02 | Stop reason: HOSPADM

## 2022-09-02 RX ORDER — FENTANYL CITRATE 50 UG/ML
50 INJECTION, SOLUTION INTRAMUSCULAR; INTRAVENOUS AS NEEDED
Status: DISCONTINUED | OUTPATIENT
Start: 2022-09-02 | End: 2022-09-02 | Stop reason: HOSPADM

## 2022-09-02 RX ORDER — SODIUM CHLORIDE 0.9 % (FLUSH) 0.9 %
5-40 SYRINGE (ML) INJECTION AS NEEDED
Status: DISCONTINUED | OUTPATIENT
Start: 2022-09-02 | End: 2022-09-02 | Stop reason: HOSPADM

## 2022-09-02 RX ORDER — ONDANSETRON 2 MG/ML
4 INJECTION INTRAMUSCULAR; INTRAVENOUS AS NEEDED
Status: DISCONTINUED | OUTPATIENT
Start: 2022-09-02 | End: 2022-09-02 | Stop reason: HOSPADM

## 2022-09-02 RX ORDER — LIDOCAINE HYDROCHLORIDE 10 MG/ML
0.1 INJECTION, SOLUTION EPIDURAL; INFILTRATION; INTRACAUDAL; PERINEURAL AS NEEDED
Status: DISCONTINUED | OUTPATIENT
Start: 2022-09-02 | End: 2022-09-02 | Stop reason: HOSPADM

## 2022-09-02 RX ORDER — MORPHINE SULFATE 2 MG/ML
2 INJECTION, SOLUTION INTRAMUSCULAR; INTRAVENOUS
Status: DISCONTINUED | OUTPATIENT
Start: 2022-09-02 | End: 2022-09-02 | Stop reason: HOSPADM

## 2022-09-02 RX ORDER — SODIUM CHLORIDE 9 MG/ML
50 INJECTION, SOLUTION INTRAVENOUS CONTINUOUS
Status: DISCONTINUED | OUTPATIENT
Start: 2022-09-02 | End: 2022-09-02 | Stop reason: HOSPADM

## 2022-09-02 RX ORDER — SODIUM CHLORIDE, SODIUM LACTATE, POTASSIUM CHLORIDE, CALCIUM CHLORIDE 600; 310; 30; 20 MG/100ML; MG/100ML; MG/100ML; MG/100ML
INJECTION, SOLUTION INTRAVENOUS
Status: DISCONTINUED | OUTPATIENT
Start: 2022-09-02 | End: 2022-09-02 | Stop reason: HOSPADM

## 2022-09-02 RX ORDER — MIDAZOLAM HYDROCHLORIDE 1 MG/ML
0.5 INJECTION, SOLUTION INTRAMUSCULAR; INTRAVENOUS
Status: DISCONTINUED | OUTPATIENT
Start: 2022-09-02 | End: 2022-09-02 | Stop reason: HOSPADM

## 2022-09-02 RX ORDER — FENTANYL CITRATE 50 UG/ML
INJECTION, SOLUTION INTRAMUSCULAR; INTRAVENOUS AS NEEDED
Status: DISCONTINUED | OUTPATIENT
Start: 2022-09-02 | End: 2022-09-02 | Stop reason: HOSPADM

## 2022-09-02 RX ORDER — SODIUM CHLORIDE, SODIUM LACTATE, POTASSIUM CHLORIDE, CALCIUM CHLORIDE 600; 310; 30; 20 MG/100ML; MG/100ML; MG/100ML; MG/100ML
75 INJECTION, SOLUTION INTRAVENOUS CONTINUOUS
Status: DISCONTINUED | OUTPATIENT
Start: 2022-09-02 | End: 2022-09-02 | Stop reason: HOSPADM

## 2022-09-02 RX ORDER — OXYCODONE AND ACETAMINOPHEN 5; 325 MG/1; MG/1
1 TABLET ORAL AS NEEDED
Status: DISCONTINUED | OUTPATIENT
Start: 2022-09-02 | End: 2022-09-02 | Stop reason: HOSPADM

## 2022-09-02 RX ORDER — SODIUM CHLORIDE 0.9 % (FLUSH) 0.9 %
5-40 SYRINGE (ML) INJECTION EVERY 8 HOURS
Status: DISCONTINUED | OUTPATIENT
Start: 2022-09-02 | End: 2022-09-02 | Stop reason: HOSPADM

## 2022-09-02 RX ORDER — ONDANSETRON 2 MG/ML
INJECTION INTRAMUSCULAR; INTRAVENOUS AS NEEDED
Status: DISCONTINUED | OUTPATIENT
Start: 2022-09-02 | End: 2022-09-02 | Stop reason: HOSPADM

## 2022-09-02 RX ORDER — LIDOCAINE HYDROCHLORIDE AND EPINEPHRINE 10; 10 MG/ML; UG/ML
INJECTION, SOLUTION INFILTRATION; PERINEURAL AS NEEDED
Status: DISCONTINUED | OUTPATIENT
Start: 2022-09-02 | End: 2022-09-02 | Stop reason: HOSPADM

## 2022-09-02 RX ORDER — BACITRACIN ZINC 500 UNIT/G
OINTMENT (GRAM) TOPICAL AS NEEDED
Status: DISCONTINUED | OUTPATIENT
Start: 2022-09-02 | End: 2022-09-02 | Stop reason: HOSPADM

## 2022-09-02 RX ORDER — KETOROLAC TROMETHAMINE 30 MG/ML
INJECTION, SOLUTION INTRAMUSCULAR; INTRAVENOUS AS NEEDED
Status: DISCONTINUED | OUTPATIENT
Start: 2022-09-02 | End: 2022-09-02 | Stop reason: HOSPADM

## 2022-09-02 RX ORDER — MIDAZOLAM HYDROCHLORIDE 1 MG/ML
1 INJECTION, SOLUTION INTRAMUSCULAR; INTRAVENOUS AS NEEDED
Status: DISCONTINUED | OUTPATIENT
Start: 2022-09-02 | End: 2022-09-02 | Stop reason: HOSPADM

## 2022-09-02 RX ORDER — FENTANYL CITRATE 50 UG/ML
25 INJECTION, SOLUTION INTRAMUSCULAR; INTRAVENOUS
Status: DISCONTINUED | OUTPATIENT
Start: 2022-09-02 | End: 2022-09-02 | Stop reason: HOSPADM

## 2022-09-02 RX ORDER — LIDOCAINE HYDROCHLORIDE 20 MG/ML
INJECTION, SOLUTION EPIDURAL; INFILTRATION; INTRACAUDAL; PERINEURAL AS NEEDED
Status: DISCONTINUED | OUTPATIENT
Start: 2022-09-02 | End: 2022-09-02 | Stop reason: HOSPADM

## 2022-09-02 RX ORDER — DIPHENHYDRAMINE HYDROCHLORIDE 50 MG/ML
12.5 INJECTION, SOLUTION INTRAMUSCULAR; INTRAVENOUS AS NEEDED
Status: DISCONTINUED | OUTPATIENT
Start: 2022-09-02 | End: 2022-09-02 | Stop reason: HOSPADM

## 2022-09-02 RX ORDER — PROPOFOL 10 MG/ML
INJECTION, EMULSION INTRAVENOUS AS NEEDED
Status: DISCONTINUED | OUTPATIENT
Start: 2022-09-02 | End: 2022-09-02 | Stop reason: HOSPADM

## 2022-09-02 RX ORDER — HYDROMORPHONE HYDROCHLORIDE 1 MG/ML
0.2 INJECTION, SOLUTION INTRAMUSCULAR; INTRAVENOUS; SUBCUTANEOUS
Status: DISCONTINUED | OUTPATIENT
Start: 2022-09-02 | End: 2022-09-02 | Stop reason: HOSPADM

## 2022-09-02 RX ORDER — DEXAMETHASONE SODIUM PHOSPHATE 4 MG/ML
INJECTION, SOLUTION INTRA-ARTICULAR; INTRALESIONAL; INTRAMUSCULAR; INTRAVENOUS; SOFT TISSUE AS NEEDED
Status: DISCONTINUED | OUTPATIENT
Start: 2022-09-02 | End: 2022-09-02 | Stop reason: HOSPADM

## 2022-09-02 RX ADMIN — DEXMEDETOMIDINE HYDROCHLORIDE 10 MCG: 100 INJECTION, SOLUTION, CONCENTRATE INTRAVENOUS at 07:17

## 2022-09-02 RX ADMIN — OXYCODONE AND ACETAMINOPHEN 1 TABLET: 5; 325 TABLET ORAL at 09:22

## 2022-09-02 RX ADMIN — MIDAZOLAM 2 MG: 1 INJECTION INTRAMUSCULAR; INTRAVENOUS at 07:12

## 2022-09-02 RX ADMIN — PROPOFOL 200 MG: 10 INJECTION, EMULSION INTRAVENOUS at 07:22

## 2022-09-02 RX ADMIN — SODIUM CHLORIDE, POTASSIUM CHLORIDE, SODIUM LACTATE AND CALCIUM CHLORIDE: 600; 310; 30; 20 INJECTION, SOLUTION INTRAVENOUS at 07:17

## 2022-09-02 RX ADMIN — CEFOXITIN SODIUM 2 G: 2 POWDER, FOR SOLUTION INTRAVENOUS at 07:17

## 2022-09-02 RX ADMIN — FENTANYL CITRATE 50 MCG: 50 INJECTION, SOLUTION INTRAMUSCULAR; INTRAVENOUS at 07:48

## 2022-09-02 RX ADMIN — SODIUM CHLORIDE, POTASSIUM CHLORIDE, SODIUM LACTATE AND CALCIUM CHLORIDE 75 ML/HR: 600; 310; 30; 20 INJECTION, SOLUTION INTRAVENOUS at 07:04

## 2022-09-02 RX ADMIN — FENTANYL CITRATE 50 MCG: 50 INJECTION, SOLUTION INTRAMUSCULAR; INTRAVENOUS at 07:22

## 2022-09-02 RX ADMIN — LIDOCAINE HYDROCHLORIDE 80 MG: 20 INJECTION, SOLUTION EPIDURAL; INFILTRATION; INTRACAUDAL; PERINEURAL at 07:22

## 2022-09-02 RX ADMIN — ONDANSETRON HYDROCHLORIDE 4 MG: 2 INJECTION, SOLUTION INTRAMUSCULAR; INTRAVENOUS at 07:31

## 2022-09-02 RX ADMIN — DEXAMETHASONE SODIUM PHOSPHATE 4 MG: 4 INJECTION, SOLUTION INTRAMUSCULAR; INTRAVENOUS at 07:31

## 2022-09-02 RX ADMIN — KETOROLAC TROMETHAMINE 30 MG: 30 INJECTION, SOLUTION INTRAMUSCULAR; INTRAVENOUS at 08:18

## 2022-09-02 NOTE — PROGRESS NOTES
Discharge instructions reviewed with patient and family using teachback . All questions have been answered. Vital signs stable, pain appropriately managed. Patient wheeled off the unit with PACU staff.

## 2022-09-02 NOTE — H&P (VIEW-ONLY)
[address] [place] [address] [address], [address]  P [phone]  F [phone]    Office Note  Patient ID:  Name:  Eduard Tucker  MRN:  [id number]  :  2000/21 y.o. Date:  2022      HISTORY OF PRESENT ILLNESS:  Ms. Eduard Tucker is a 25 y.o. female who presents as a new patient from Dr. Milvia Navarro for vulvar mass. Dr. Milvia Navarro called me last week regarding this patient with a vulvar lesion/abscess that contained sebum and hair like a dermoid cyst or pilonidal cyst. The patient reports this has been there for about 6 months. She reports it grows and then drains and then grows again. She has an infection a couple of times and is currently on antibiotics. Denies fevers or chills. Reports she has always had an indention in this area and a little knot. She has noticed this for years. Imaging Review:   N/a    ROS:  A comprehensive review of systems was negative except for that written in the History of Present Illness. , 10 point ROS      ECOG stGstrstastdstest:st st1st Problem List:  Patient Active Problem List    Diagnosis Date Noted    Nummular dermatitis 2020    Chlamydia infection 2019    Weight gain 2018     PMH:  Past Medical History:   Diagnosis Date    Allergic rhinitis     Asthma     CHILD    Otitis media     Reactive airway disease     Strep throat     Strep throat       PSH:  History reviewed. No pertinent surgical history. Social History:  Social History     Tobacco Use    Smoking status: Never    Smokeless tobacco: Former   Substance Use Topics    Alcohol use: No      Family History:  Family History   Problem Relation Age of Onset    Headache Brother     Hypertension Maternal Grandfather     Hypertension Paternal Grandfather     Cancer Other     Diabetes Other     Headache Other     Heart Disease Other     Anesth Problems Neg Hx       Medications: (reviewed)  No current outpatient medications on file.      No current facility-administered medications for this visit. Allergies: (reviewed)  No Known Allergies       OBJECTIVE:    Physical Exam:  VITAL SIGNS: Vitals:    08/31/22 1324   BP: (!) 101/58   Pulse: (!) 55   Weight: 137 lb 9.6 oz (62.4 kg)   Height: 5' 3\" (1.6 m)     Body mass index is 24.37 kg/m². GENERAL CHRISTIAN: Conversant, alert, oriented. No acute distress. HEENT: HEENT. No thyroid enlargement. No JVD. Neck: Supple without restrictions. RESPIRATORY: Clear to auscultation and percussion to the bases. No CVAT. CARDIOVASC: RRR without murmur/rub. GASTROINT: soft, non-tender, without masses or organomegaly   MUSCULOSKEL: no joint tenderness, deformity or swelling       EXTREMITIES: extremities normal, atraumatic, no cyanosis or edema   PELVIC: Exam chaperoned by nurse. Normal appearing external genitalia except for a small inverted hair follicle along the left vulva at 5 o'clock. The stab incision from Dr. Tamy Brooke is visualized and there is sebum and hair within a 1-2cm cyst under the skin. No induration or erythema surrounding. Deferred vaginal exam.    RECTAL: deferred   NICO SURVEY: No suspicious lymphadenopathy or edema noted. NEURO: Grossly intact. No acute deficit.        Lab Date as available:    Lab Results   Component Value Date/Time    WBC 5.3 02/27/2019 01:44 PM    HGB 12.5 02/27/2019 01:44 PM    HCT 38.7 02/27/2019 01:44 PM    PLATELET 249 61/11/4111 01:44 PM    MCV 85 02/27/2019 01:44 PM     Lab Results   Component Value Date/Time    Sodium 138 04/01/2016 06:00 AM    Potassium 3.8 04/01/2016 06:00 AM    Chloride 106 04/01/2016 06:00 AM    CO2 24 04/01/2016 06:00 AM    Anion gap 8 04/01/2016 06:00 AM    Glucose 132 (H) 04/01/2016 06:00 AM    BUN 7 04/01/2016 06:00 AM    Creatinine 0.57 (L) 04/01/2016 06:00 AM    BUN/Creatinine ratio 12 04/01/2016 06:00 AM    GFR est AA >60 04/01/2016 06:00 AM    GFR est non-AA >60 04/01/2016 06:00 AM    Calcium 8.7 04/01/2016 06:00 AM         IMPRESSION/PLAN:    Ms. Lily Francois is a 25 y.o. female who presents with vulvar lesion    Problems:     Patient Active Problem List    Diagnosis Date Noted    Nummular dermatitis 09/25/2020    Chlamydia infection 07/17/2019    Weight gain 05/22/2018       I reviewed Ms. Lakhwinder Guajardo's course to date, including her medical records, recent studies, physical exam, and review of symptoms. On exam it appears that the patient has an area of an inverted hair follicle that has been growing into a cyst under the skin. Other differential includes dermoid cyst versus pilonidal cyst. However, the location excludes a pilonidal cyst; although the patient's history and exam are consistent with a pilonidal cyst-like structure. I have recommended incision and drainage of this area, along with a simple vulvectomy of the inverted area of skin, as I believe this is the source of her issue. Will post for surgery at the patient's convenience. Reviewed risks, benefits, indications, and alternatives of surgery. All questions and concerns were addressed with the patient and she is comfortable with the plan.      Defined Sensitive Document    >50% of total time allocated to visit dedicated to counseling, 50 minutes total.    Signed By: Johanna Kumar MD     9/2/2022/6:53 AM

## 2022-09-02 NOTE — DISCHARGE INSTRUCTIONS
[address] of [address], [place] [address], [address]  P [phone]  F [phone]       [facility]      Dear Ms. Cecil Coe,      Please review your instructions with your nurse and ask any questions so you have all the information you need to recover well at home. If you do not feel you have everything you need to succeed and be safe after you leave the hospital, please discuss these concerns with your nurse. As always, call for any questions at home. Your doctor: Abel Garcia MD   Diagnosis: VULVAR ABSCESS  Procedure: Procedure(s):  SIMPLE VULVECTOMY, RESECTION VULVAR MASS  Date of Discharge: 09/02/22       Take Home Medications     See Discharge Medication Review provided to you by your nurse. If you did not receive one, request this prior to your discharge. It is important that you take your medications as they are prescribed. Your prescriptions are sent to your pharmacy on record. Keep your medications in the bottles provided by the pharmacist and keep a list of the medication names, dosages, times to be taken and what they are for in your wallet. Do not take other medications without consulting your doctor. You may take acetaminophen (Tylenol) and ibuprofen (Advil) for pain. If this is not sufficient for your pain, take tramadol or other pain medication you were provided. You should take a daily gentle stool softener such as a colace pill or dulcolax suppository for constipation as this is not uncommon after surgery and/or while on pain medication. If not prescribed, this can be found over the counter. If constipation persists for >24 hours you should take a dose of Milk of Magnesia. Call if your constipation continues. Diet  Stay hydrated and drink fluids such as gatorade and water. This will also help prevent constipation and dehydration.  Limit somewhat any usual caffeine intake of beverages such as soda, tea and coffee as this may serve to dehydrate you. For the first 2-3 days keep a low fiber diet avoiding raw vegetables or fruits with skin. A diet consisting of soup, cereal, yogurt, eggs, fish, Boost/Ensure. Avoid fatty/greasy foods. If nauseated, keep your diet limited to liquids and call if this persists. Activity  If possible, have someone with you at all times until you feel stable. Gradually increase your activity each day. There are generally no restrictions on walking, climbing stairs or riding in a car. Walk at least 4 times per day. Walking will help reduce the risk of blood clots and constipation. Showers are okay. If you have an incision, no tub bathing/swimming for two weeks. No driving for 2 week and/or while on pain medication. The following restrictions apply:  No heavy lifting greater than 15 lbs for 4 weeks, then 25 lbs for the next 4 weeks. If you had any vaginal procedure or a hysterectomy, nothing per vagina for 6-8 weeks. You may experience vaginal spotting. Should the bleeding require more that 4 pads a day please call our office. Incision  You should expect some discomfort in the area of your incision, particularly as you increase your activity. If you notice an area of increasing redness or new drainage, please call your doctor. Your incisions will have buried, absorbable sutures, which do not need to be removed and are covered by protective glue. Please allow this to fall off on its own over time and refrain from peeling it off unless it is no longer covering the incision. Use the iced rupesh-pads for comfort  Gently clean the area and pat dry- a spray or squirt bottle helps with this      Causes For Concern    If any of the following occur, please call our office and speak with the Nurse/aid who will help you with your problem or ask the doctor to call you.     Problems with the incision, including increasing pain, swelling, redness or drainage. Inability to pass urine   Increasing abdominal pain despite medication  Persisting nausea or vomiting. Fever or chills and a temperature >101F  Constipation (no bowel movement for three days). Diarrhea (more than three watery stools within 24 hours). Excessive vaginal or wound bleeding. If after hours and you cannot reach an on-call physician, call 911. Follow-Up  Call [phone] to schedule the following appointments with Dr. Margoth Raya:  Telephone virtual-visit in 10-14 days to discuss your pathology results. In-office visit for your postoperative exam in 6 weeks from surgery. Information obtained by :  I understand that if any problems occur once I am at home I am to contact my physician. I understand and acknowledge receipt of the instructions indicated above. Physician's or R.N.'s Signature                                                                  Date/Time                                                                                                                                              Patient or Representative Signature                                                          Date/Time      ______________________________________________________________________    Anesthesia Discharge Instructions    After general anesthesia or intervenous sedation, for 24 hours or while taking prescription Narcotics:  Limit your activities  Do not drive or operate hazardous machinery  If you have not urinated within 8 hours after discharge, please contact your surgeon on call.   Do not make important personal or business decisions  Do not drink alcoholic beverages    Report the following to your surgeon:  Excessive pain, swelling, redness or odor of or around the surgical area  Temperature over 100.5 degrees  Nausea and vomiting lasting longer than 4 hours or if unable to take medication  Any signs of decreased circulation or nerve impairment to extremity:  Change in color, persistent numbness, tingling, coldness or increased pain.   Any questions

## 2022-09-02 NOTE — BRIEF OP NOTE
Brief Postoperative Note    Patient: Diandra Kumar  YOB: 2000  MRN: [id number]    Date of Procedure: 9/2/2022     Pre-Op Diagnosis: VULVAR ABSCESS; VULVAR CYST    Post-Op Diagnosis: Same as preoperative diagnosis. Procedure(s):  SIMPLE VULVECTOMY, RESECTION VULVAR MASS    Surgeon(s): Charly Cesar MD    Surgical Assistant: None    Anesthesia: General     Estimated Blood Loss (mL): Minimal    Complications: None    Specimens:   ID Type Source Tests Collected by Time Destination   1 : VULVECTOMY Fresh VULVA  Charly Cesar MD 9/2/2022 2329 Pathology   2 : CYST CONTENTS Fresh FLEXVA  Charly Cesar MD 9/2/2022 2011 Pathology   3 : CYST WALL Fresh VULVA  Charly Cesar MD 9/2/2022 0156 Pathology        Implants: * No implants in log *    Drains: * No LDAs found *    Findings: On exam, the patient has a vulvar cyst that is firm on the left vulva at 5 o'clock. As noted above, a prior stab incision by Dr. Martha Munoz demonstrated sebum and hair follicles. During dissection, the cyst extended approximately 3cm deep and 1cm in width and I was able to digitally inspect the entire area.  The cyst wall was resected in entirety, along with the portion of skin at the surface that involved the cyst.     Electronically Signed by Deena Lo MD on 9/2/2022 at 10:30 AM

## 2022-09-02 NOTE — INTERVAL H&P NOTE
Date of Surgery Update:  Lexx Guzman was seen and examined. History and physical has been reviewed. The patient has been examined.  There have been no significant clinical changes since the completion of the originally dated History and Physical.    Signed By: Esha Faye MD     September 2, 2022 7:00 AM

## 2022-09-02 NOTE — ANESTHESIA POSTPROCEDURE EVALUATION
Procedure(s):  SIMPLE VULVECTOMY, RESECTION VULVAR MASS. general    Anesthesia Post Evaluation      Multimodal analgesia: multimodal analgesia used between 6 hours prior to anesthesia start to PACU discharge  Patient location during evaluation: PACU  Patient participation: complete - patient participated  Level of consciousness: awake and alert  Pain management: adequate  Airway patency: patent  Anesthetic complications: no  Cardiovascular status: acceptable  Respiratory status: acceptable  Hydration status: acceptable  Comments: Seen, no complaints   Post anesthesia nausea and vomiting:  none  Final Post Anesthesia Temperature Assessment:  Normothermia (36.0-37.5 degrees C)      INITIAL Post-op Vital signs:   Vitals Value Taken Time   /74 09/02/22 0851   Temp 36.4 °C (97.6 °F) 09/02/22 0851   Pulse 73 09/02/22 0856   Resp 16 09/02/22 0856   SpO2 99 % 09/02/22 0856   Vitals shown include unvalidated device data.

## 2022-09-02 NOTE — ANESTHESIA PREPROCEDURE EVALUATION
Relevant Problems   No relevant active problems       Anesthetic History   No history of anesthetic complications            Review of Systems / Medical History  Patient summary reviewed, nursing notes reviewed and pertinent labs reviewed    Pulmonary            Asthma : well controlled       Neuro/Psych   Within defined limits           Cardiovascular  Within defined limits                Exercise tolerance: >4 METS     GI/Hepatic/Renal  Within defined limits              Endo/Other  Within defined limits           Other Findings   Comments: Vulvar lesion  Denies pregnancy           Physical Exam    Airway  Mallampati: II  TM Distance: 4 - 6 cm  Neck ROM: normal range of motion   Mouth opening: Normal     Cardiovascular  Regular rate and rhythm,  S1 and S2 normal,  no murmur, click, rub, or gallop  Rhythm: regular  Rate: normal         Dental  No notable dental hx       Pulmonary  Breath sounds clear to auscultation               Abdominal  GI exam deferred       Other Findings            Anesthetic Plan    ASA: 1  Anesthesia type: general          Induction: Intravenous  Anesthetic plan and risks discussed with: Patient

## 2022-09-02 NOTE — PROGRESS NOTES
9/2/2022      RE: Keyla Nicole      To Whom it May Concern: This is to certify that Keyla Nicole had a surgical procedure on 9/2/2022, requiring care for 9/2 and 9/3. Please feel free to contact Dr. Marvin Stanley office if you have any questions or concerns. Thank you for your assistance in this matter. Sincerely,      Damon Rodgers.  [place]

## 2022-09-02 NOTE — PROGRESS NOTES
[address] [place] [address] Mathias Moritz [address], [address] Gwendolyn Ramírez  P [phone]  F [phone]    Office Note  Patient ID:  Name:  Lucas Angelo  MRN:  [id number]  :  2000/21 y.o. Date:  2022      HISTORY OF PRESENT ILLNESS:  Ms. Lucas Angelo is a 25 y.o. female who presents as a new patient from Dr. Juan Lagos for vulvar mass. Dr. Juan Lagos called me last week regarding this patient with a vulvar lesion/abscess that contained sebum and hair like a dermoid cyst or pilonidal cyst. The patient reports this has been there for about 6 months. She reports it grows and then drains and then grows again. She has an infection a couple of times and is currently on antibiotics. Denies fevers or chills. Reports she has always had an indention in this area and a little knot. She has noticed this for years. Imaging Review:   N/a    ROS:  A comprehensive review of systems was negative except for that written in the History of Present Illness. , 10 point ROS      ECOG stGstrstastdstest:st st1st Problem List:  Patient Active Problem List    Diagnosis Date Noted    Nummular dermatitis 2020    Chlamydia infection 2019    Weight gain 2018     PMH:  Past Medical History:   Diagnosis Date    Allergic rhinitis     Asthma     CHILD    Otitis media     Reactive airway disease     Strep throat     Strep throat       PSH:  History reviewed. No pertinent surgical history. Social History:  Social History     Tobacco Use    Smoking status: Never    Smokeless tobacco: Former   Substance Use Topics    Alcohol use: No      Family History:  Family History   Problem Relation Age of Onset    Headache Brother     Hypertension Maternal Grandfather     Hypertension Paternal Grandfather     Cancer Other     Diabetes Other     Headache Other     Heart Disease Other     Anesth Problems Neg Hx       Medications: (reviewed)  No current outpatient medications on file.      No current facility-administered medications for this visit. Allergies: (reviewed)  No Known Allergies       OBJECTIVE:    Physical Exam:  VITAL SIGNS: Vitals:    08/31/22 1324   BP: (!) 101/58   Pulse: (!) 55   Weight: 137 lb 9.6 oz (62.4 kg)   Height: 5' 3\" (1.6 m)     Body mass index is 24.37 kg/m². GENERAL CHRISTIAN: Conversant, alert, oriented. No acute distress. HEENT: HEENT. No thyroid enlargement. No JVD. Neck: Supple without restrictions. RESPIRATORY: Clear to auscultation and percussion to the bases. No CVAT. CARDIOVASC: RRR without murmur/rub. GASTROINT: soft, non-tender, without masses or organomegaly   MUSCULOSKEL: no joint tenderness, deformity or swelling       EXTREMITIES: extremities normal, atraumatic, no cyanosis or edema   PELVIC: Exam chaperoned by nurse. Normal appearing external genitalia except for a small inverted hair follicle along the left vulva at 5 o'clock. The stab incision from Dr. Muriel Tucker is visualized and there is sebum and hair within a 1-2cm cyst under the skin. No induration or erythema surrounding. Deferred vaginal exam.    RECTAL: deferred   NICO SURVEY: No suspicious lymphadenopathy or edema noted. NEURO: Grossly intact. No acute deficit.        Lab Date as available:    Lab Results   Component Value Date/Time    WBC 5.3 02/27/2019 01:44 PM    HGB 12.5 02/27/2019 01:44 PM    HCT 38.7 02/27/2019 01:44 PM    PLATELET 628 39/85/0957 01:44 PM    MCV 85 02/27/2019 01:44 PM     Lab Results   Component Value Date/Time    Sodium 138 04/01/2016 06:00 AM    Potassium 3.8 04/01/2016 06:00 AM    Chloride 106 04/01/2016 06:00 AM    CO2 24 04/01/2016 06:00 AM    Anion gap 8 04/01/2016 06:00 AM    Glucose 132 (H) 04/01/2016 06:00 AM    BUN 7 04/01/2016 06:00 AM    Creatinine 0.57 (L) 04/01/2016 06:00 AM    BUN/Creatinine ratio 12 04/01/2016 06:00 AM    GFR est AA >60 04/01/2016 06:00 AM    GFR est non-AA >60 04/01/2016 06:00 AM    Calcium 8.7 04/01/2016 06:00 AM         IMPRESSION/PLAN:    Ms. Jessica Kruger is a 25 y.o. female who presents with vulvar lesion    Problems:     Patient Active Problem List    Diagnosis Date Noted    Nummular dermatitis 09/25/2020    Chlamydia infection 07/17/2019    Weight gain 05/22/2018       I reviewed Ms. Lakhwinder Guajardo's course to date, including her medical records, recent studies, physical exam, and review of symptoms. On exam it appears that the patient has an area of an inverted hair follicle that has been growing into a cyst under the skin. Other differential includes dermoid cyst versus pilonidal cyst. However, the location excludes a pilonidal cyst; although the patient's history and exam are consistent with a pilonidal cyst-like structure. I have recommended incision and drainage of this area, along with a simple vulvectomy of the inverted area of skin, as I believe this is the source of her issue. Will post for surgery at the patient's convenience. Reviewed risks, benefits, indications, and alternatives of surgery. All questions and concerns were addressed with the patient and she is comfortable with the plan.      Defined Sensitive Document    >50% of total time allocated to visit dedicated to counseling, 50 minutes total.    Signed By: Federico Yoder MD     9/2/2022/6:53 AM

## 2022-09-06 NOTE — OP NOTES
Gynecologic Oncology Operative Report    Lakhwinder Eason    Pre-operative dx: 1) Vulvar lesion    Post-operative dx: 1) Vulvar lesion     Procedure:  Simple vulvectomy, resection of vulvar cyst    Surgeon:  Selena Quesada MD    Assistant: n/a     Anesthesia:  LMA    EBL:  minimal    Complications:  None    Implants: none    Specimens: vulvectomy, vulvar cyst    Operative indications: 25 y.o. female with 1-2cm vulvar lesion/cyst containing hair and sebum along the left vulvar at 5 o'clock     Operative findings: On exam, the patient has a vulvar cyst that is firm on the left vulva at 5 o'clock. As noted above, a prior stab incision by Dr. Ryne Gant demonstrated sebum and hair follicles. During dissection, the cyst extended approximately 3cm deep and 1cm in width and I was able to digitally inspect the entire area. The cyst wall was resected in entirety, along with the portion of skin at the surface that involved the cyst.      Operative report: After the risks, benefits, indications, and alternatives of the procedure were discussed with the patient and informed consent was obtained, the patient was taken to the operating room. She was positively identified, administered general anesthesia, and then placed in the dorsal lithotomy position in [address] [place] [address]. An exam under anesthesia was performed with the above findings. She was then prepped and draped in the usual fashion. The bladder was drained with a red rubber catheter. The lesion was then outlined using a marking pen. Using a 15-blade, an incision was made along the markings circumferentially. Then using a needlepoint Bovie electrocautery, the lesion was excised with full thickness through the epidermis and dermis. At this point, the cyst was uncovered and the cyst contents were evacuated and contained sebum and hair. The cyst was extended approximately 3cm deep and 1cm wide.  At this point the cyst wall was dissected out from the underlying vulvar tissue and muscle. The specimens were then sent for permanent pathology. The surgical wounds were then irrigated. The deep portion was re-approximated with 2-0 Vicryl. The skin edges were then re-approximated with 2-0 Vicryl using horizontal mattress sutures. The vulva and perineum were irrigated and hemostasis confirmed. Bacitracin was applied to the wound edges. The patient was taken out of stirrups, awakened from anesthesia, and taken to the recovery room in stable condition. All instrument, sponge, and needle counts were correct.         Ellen Camargo MD  9/2/2022

## 2022-10-10 NOTE — PROGRESS NOTES
Post op Visit, surgery was on 9/2/22    1. Have you been to the ER, urgent care clinic since your last visit? Hospitalized since your last visit? Yes, surgery with Dr. Eden Durand on 9/2/22    2. Have you seen or consulted any other health care providers outside of the [address] [place] [address] since your last visit? Include any pap smears or colon screening.    no

## 2022-10-11 ENCOUNTER — OFFICE VISIT (OUTPATIENT)
Dept: GYNECOLOGY | Age: 22
End: 2022-10-11
Payer: MEDICAID

## 2022-10-11 VITALS
SYSTOLIC BLOOD PRESSURE: 111 MMHG | HEART RATE: 53 BPM | WEIGHT: 131.6 LBS | HEIGHT: 63 IN | DIASTOLIC BLOOD PRESSURE: 61 MMHG | BODY MASS INDEX: 23.32 KG/M2

## 2022-10-11 DIAGNOSIS — N90.89 VULVAR LESION: Primary | ICD-10-CM

## 2022-10-11 PROCEDURE — 99024 POSTOP FOLLOW-UP VISIT: CPT | Performed by: OBSTETRICS & GYNECOLOGY

## 2022-10-11 NOTE — PROGRESS NOTES
[address] [facility] Mathias Moritz [address], [address] [facility]  P [phone]  F [phone]    Office Note  Patient ID:  Name:  Annette Mera  MRN:  [id number]  :  2000/21 y.o. Date:  10/11/2022      HISTORY OF PRESENT ILLNESS:  Ms. Annette Mera is a 25 y.o. female who presented with a vulvar lesion. On 2022 underwent simple vulvectomy, resection of vulvar cyst. Final pathology benign: acute and chronic inflammation involving fragments of skin and squmous mucosal lined cyst wall. Doing well since surgery. No issues today. Interval History  Ms. Annette Mera is a 25 y.o. female who presents as a new patient from Dr. Emely Jackson for vulvar mass. Dr. Emely Jackson called me last week regarding this patient with a vulvar lesion/abscess that contained sebum and hair like a dermoid cyst or pilonidal cyst. The patient reports this has been there for about 6 months. She reports it grows and then drains and then grows again. She has an infection a couple of times and is currently on antibiotics. Denies fevers or chills. Reports she has always had an indention in this area and a little knot. She has noticed this for years. Pathology Review:   2022:  * * *FINAL PATHOLOGIC DIAGNOSIS* * *   1.  Vulva, partial vulvectomy:        Acute and chronic inflammation involving adnexal structures including   hair follicles. 2.  Cyst wall, vulva:         Fragments of skin and squamous mucosal lined cyst wall  3. Cyst contents, vulva:        Fragments of acellular debris consistent with cyst contents and few   hair follicles. ROS:  A comprehensive review of systems was negative except for that written in the History of Present Illness.  , 10 point ROS      ECOG stGstrstastdstest:st st1st Problem List:  Patient Active Problem List    Diagnosis Date Noted    Vulvar lesion 2022    Nummular dermatitis 2020    Chlamydia infection 2019    Weight gain 2018     PMH:  Past Medical History:   Diagnosis Date    Allergic rhinitis     Asthma     CHILD    Otitis media     Reactive airway disease     Strep throat     Strep throat       PSH:  Past Surgical History:   Procedure Laterality Date    HX GYN  09/02/2022    Simple vulvectomy, resection of vulvar cyst      Social History:  Social History     Tobacco Use    Smoking status: Never    Smokeless tobacco: Former   Substance Use Topics    Alcohol use: No      Family History:  Family History   Problem Relation Age of Onset    Headache Brother     Hypertension Maternal Grandfather     Hypertension Paternal Grandfather     Cancer Other     Diabetes Other     Headache Other     Heart Disease Other     Anesth Problems Neg Hx       Medications: (reviewed)  Current Outpatient Medications   Medication Sig    metroNIDAZOLE (FLAGYL) 500 mg tablet TAKE 1 TABLET BY MOUTH THREE TIMES A DAY FOR 7 DAYS (Patient not taking: Reported on 10/11/2022)     No current facility-administered medications for this visit. Allergies: (reviewed)  No Known Allergies       OBJECTIVE:  Physical Exam:  Visit Vitals  /61 (BP 1 Location: Left upper arm, BP Patient Position: Sitting)   Pulse (!) 53   Ht 5' 3\" (1.6 m)   Wt 131 lb 9.6 oz (59.7 kg)   LMP 09/14/2022 (Exact Date)   BMI 23.31 kg/m²      General: Alert and oriented. No acute distress. Well-nourished  Musculoskeletal: normal gait. No joint tenderness, deformity or swelling. No muscular tenderness. Extremities: extremities normal, atraumatic, no cyanosis or edema. Pelvic: exam chaperoned by nurse. Normal appearing external genitalia. Her incision has healed well. Still some minor skin healing at the site. Neuro: Grossly intact. Normal gait and movement. No acute deficit  Skin: No evidence of rashes or skin changes. IMPRESSION/PLAN:    Ms. Eve Merchant is a 25 y.o. female who presented with a vulvar lesion.  On 9/2/2022 underwent simple vulvectomy, resection of vulvar cyst. Final pathology benign: acute and chronic inflammation involving fragments of skin and squmous mucosal lined cyst wall. Problems:     Patient Active Problem List    Diagnosis Date Noted    Vulvar lesion 09/02/2022    Nummular dermatitis 09/25/2020    Chlamydia infection 07/17/2019    Weight gain 05/22/2018       Reviewed patient's course to date, including her benign surgical pathology. She has healed well from surgery. There is still a small area where the skin is healing with some reactive changes under the skin. Plan to RTC in 2 months for follow-up exam to be sure this area is not trying to reform. All questions and concerns were addressed with the patient and she is comfortable with the plan. An electronic signature was used to authenticate this note.      Abdiel rOtega MD

## 2022-10-21 ENCOUNTER — HOSPITAL ENCOUNTER (EMERGENCY)
Age: 22
Discharge: HOME | End: 2022-10-21
Attending: EMERGENCY MEDICINE
Payer: MEDICAID

## 2022-10-21 VITALS
RESPIRATION RATE: 16 BRPM | BODY MASS INDEX: 23.04 KG/M2 | SYSTOLIC BLOOD PRESSURE: 123 MMHG | DIASTOLIC BLOOD PRESSURE: 78 MMHG | HEART RATE: 92 BPM | HEIGHT: 63 IN | OXYGEN SATURATION: 100 % | TEMPERATURE: 98.5 F | WEIGHT: 130 LBS

## 2022-10-21 DIAGNOSIS — J02.9 ACUTE PHARYNGITIS, UNSPECIFIED ETIOLOGY: Primary | ICD-10-CM

## 2022-10-21 LAB — DEPRECATED S PYO AG THROAT QL EIA: NEGATIVE

## 2022-10-21 PROCEDURE — 87070 CULTURE OTHR SPECIMN AEROBIC: CPT

## 2022-10-21 PROCEDURE — 99283 EMERGENCY DEPT VISIT LOW MDM: CPT

## 2022-10-21 PROCEDURE — 87880 STREP A ASSAY W/OPTIC: CPT

## 2022-10-21 RX ORDER — CHLORHEXIDINE GLUCONATE 1.2 MG/ML
15 RINSE ORAL EVERY 12 HOURS
Qty: 420 ML | Refills: 0 | Status: SHIPPED | OUTPATIENT
Start: 2022-10-21 | End: 2022-11-04

## 2022-10-21 RX ORDER — AZITHROMYCIN 250 MG/1
TABLET, FILM COATED ORAL
Qty: 6 TABLET | Refills: 0 | Status: SHIPPED | OUTPATIENT
Start: 2022-10-21

## 2022-10-21 NOTE — ED PROVIDER NOTES
EMERGENCY DEPARTMENT HISTORY AND PHYSICAL EXAM      Date: 10/21/2022  Patient Name: Brigida Cyr    History of Presenting Illness     Chief Complaint   Patient presents with    Sore Throat     C/o sore throat x 2 days        History Provided By: Patient    HPI: Brigida Cyr, 25 y.o. female with no significant PMHx, presents ambulatory to the ED with cc of sore throat. Reports sore throat x2 days. Brother was sick with similar symptoms. No nausea or vomiting. Mild nasal congestion. Mild bilateral earaches. She is tolerating p.o. without difficulty. She looked in the mirror and noticed some white spots on her tonsils. She reports a mild diffuse headache. PMHx: Significant for none  PSHx: Significant for none  Social Hx: Non-smoker. There are no other complaints, changes, or physical findings at this time. PCP: Sushant Mayen NP    No current facility-administered medications on file prior to encounter.      Current Outpatient Medications on File Prior to Encounter   Medication Sig Dispense Refill    metroNIDAZOLE (FLAGYL) 500 mg tablet TAKE 1 TABLET BY MOUTH THREE TIMES A DAY FOR 7 DAYS (Patient not taking: Reported on 10/11/2022)         Past History     Past Medical History:  Past Medical History:   Diagnosis Date    Allergic rhinitis     Asthma     CHILD    Otitis media     Reactive airway disease     Strep throat     Strep throat        Past Surgical History:  Past Surgical History:   Procedure Laterality Date    HX GYN  09/02/2022    Simple vulvectomy, resection of vulvar cyst       Family History:  Family History   Problem Relation Age of Onset    Headache Brother     Hypertension Maternal Grandfather     Hypertension Paternal Grandfather     Cancer Other     Diabetes Other     Headache Other     Heart Disease Other     Anesth Problems Neg Hx        Social History:  Social History     Tobacco Use    Smoking status: Never    Smokeless tobacco: Former   Vaping Use    Vaping Use: Never used   Substance Use Topics    Alcohol use: No    Drug use: No       Allergies:  No Known Allergies      Review of Systems   Review of Systems   Constitutional:  Negative for activity change, chills and fever. HENT:  Positive for ear pain and sore throat. Negative for congestion. Eyes:  Negative for pain and redness. Respiratory:  Negative for cough, chest tightness and shortness of breath. Cardiovascular:  Negative for chest pain and palpitations. Gastrointestinal:  Negative for abdominal pain, diarrhea, nausea and vomiting. Genitourinary:  Negative for dysuria, frequency and urgency. Musculoskeletal:  Negative for back pain and neck pain. Skin:  Negative for rash. Neurological:  Negative for syncope, light-headedness and headaches. Psychiatric/Behavioral:  Negative for confusion. All other systems reviewed and are negative. Physical Exam   Physical Exam  Constitutional:       General: She is not in acute distress. Appearance: She is well-developed. She is not diaphoretic. HENT:      Head: Normocephalic and atraumatic. Mouth/Throat:      Mouth: Mucous membranes are moist.      Comments: Mild pharyngeal erythema. Mild tonsillar edema bilaterally with what appears to be 2 white 2mm tonsillar stones, each side. Eyes:      General: No scleral icterus. Conjunctiva/sclera: Conjunctivae normal.      Pupils: Pupils are equal, round, and reactive to light. Pulmonary:      Effort: Pulmonary effort is normal.   Musculoskeletal:         General: Normal range of motion. Skin:     General: Skin is warm and dry. Findings: No rash. Neurological:      Mental Status: She is alert and oriented to person, place, and time.    Psychiatric:         Behavior: Behavior normal.           Diagnostic Study Results     Labs -     Recent Results (from the past 12 hour(s))   STREP AG SCREEN, GROUP A    Collection Time: 10/21/22  3:32 PM    Specimen: Swab; Throat   Result Value Ref Range    Group A Strep Ag ID Negative NEG         Radiologic Studies -   No orders to display     CT Results  (Last 48 hours)      None          CXR Results  (Last 48 hours)      None              Medical Decision Making   I am the first provider for this patient. I reviewed the vital signs, available nursing notes, past medical history, past surgical history, family history and social history. Vital Signs-Reviewed the patient's vital signs. Patient Vitals for the past 12 hrs:   Temp Pulse Resp BP SpO2   10/21/22 1549 98.5 °F (36.9 °C) -- -- -- --   10/21/22 1548 -- (!) 125 16 123/78 100 %           Records Reviewed: Nursing notes reviewed    Provider Notes (Medical Decision Making):   DDX: Pharyngitis, viral pharyngitis, tonsillar stone. ED Course:   Initial assessment performed. The patients presenting problems have been discussed, and they are in agreement with the care plan formulated and outlined with them. I have encouraged them to ask questions as they arise throughout their visit. PROGRESS NOTE    Pt reevaluated. Discharged with Peridex mouthwash. Instructed patient to start Zithromax if no improvement in symptoms x2 days. Prep negative. Written by Tracey Nur MD     Progress note:    Pt noted to be feeling better and ready for discharge. Updated pt and/or family on all final lab and/or  imaging findings. Will follow up as instructed. All questions have been answered, pt voiced understanding and agreement with plan. Abx were prescribed, pt advised that diarrhea and rash are possible side effects of the medications. Specific return precautions provided as well as instructions to return to the ED should sx worsen at any time. Vital signs stable for discharge.      I have also put together some discharge instructions for them that include: 1) educational information regarding their diagnosis, 2) how to care for their diagnosis at home, as well a 3) list of reasons why they would want to return to the ED prior to their follow-up appointment, should their condition change. Written by Taina Prtat MD        Critical Care Time:   0    Disposition:  Discharge    PLAN:  1. Current Discharge Medication List        START taking these medications    Details   chlorhexidine (Peridex) 0.12 % solution 15 mL by Swish and Spit route every twelve (12) hours for 14 days. Qty: 420 mL, Refills: 0  Start date: 10/21/2022, End date: 11/4/2022      azithromycin (Zithromax Z-Bhavesh) 250 mg tablet Take as directed  Qty: 6 Tablet, Refills: 0  Start date: 10/21/2022           2. Follow-up Information       Follow up With Specialties Details Why Contact Info    Gurdeep Brar NP Nurse Practitioner Schedule an appointment as soon as possible for a visit in 1 week  [address] [place] [address]  [phone]            Return to ED if worse     Diagnosis     Clinical Impression:   1. Acute pharyngitis, unspecified etiology              Please note that this dictation was completed with [employer], the computer voice recognition software. Quite often unanticipated grammatical, syntax, homophones, and other interpretive errors are inadvertently transcribed by the computer software. Please disregard these errors. Please excuse any errors that have escaped final proofreading.

## 2022-10-21 NOTE — LETTER
[address] [place] [address] EMERGENCY DEP  [address] [facility] [address] [place] [address]  [phone]    Work/School Note    Date: 10/21/2022    To Whom It May concern:    Brigida Cyr was seen and treated today in the emergency room by the following provider(s):  Attending Provider: Mirela Jane MD.      Brigida Cyr may return to work on 10/23/2022.     Sincerely,          Tulio Moran RN

## 2022-10-23 LAB
BACTERIA SPEC CULT: NORMAL
SERVICE CMNT-IMP: NORMAL

## 2022-10-24 ENCOUNTER — TELEPHONE (OUTPATIENT)
Dept: FAMILY MEDICINE CLINIC | Age: 22
End: 2022-10-24

## 2022-10-24 ENCOUNTER — HOSPITAL ENCOUNTER (EMERGENCY)
Age: 22
Discharge: HOME | End: 2022-10-24
Attending: EMERGENCY MEDICINE
Payer: MEDICAID

## 2022-10-24 VITALS
HEIGHT: 63 IN | HEART RATE: 98 BPM | TEMPERATURE: 98.9 F | OXYGEN SATURATION: 99 % | SYSTOLIC BLOOD PRESSURE: 118 MMHG | DIASTOLIC BLOOD PRESSURE: 64 MMHG | RESPIRATION RATE: 16 BRPM | BODY MASS INDEX: 22.86 KG/M2 | WEIGHT: 129 LBS

## 2022-10-24 DIAGNOSIS — J35.8 TONSIL STONE: Primary | ICD-10-CM

## 2022-10-24 DIAGNOSIS — J02.9 EXUDATIVE PHARYNGITIS: Primary | ICD-10-CM

## 2022-10-24 PROCEDURE — 99282 EMERGENCY DEPT VISIT SF MDM: CPT

## 2022-10-24 RX ORDER — DEXAMETHASONE SODIUM PHOSPHATE 4 MG/ML
10 INJECTION, SOLUTION INTRA-ARTICULAR; INTRALESIONAL; INTRAMUSCULAR; INTRAVENOUS; SOFT TISSUE
Status: DISCONTINUED | OUTPATIENT
Start: 2022-10-24 | End: 2022-10-24

## 2022-10-24 NOTE — TELEPHONE ENCOUNTER
Pt was seen in the Er twice for tonsil stones and is requesting a urgent appt. You are currently booked. Could we possibly refer her to ent instead?

## 2022-10-24 NOTE — TELEPHONE ENCOUNTER
----- Message from Cari Madi sent at 10/24/2022 11:30 AM EDT -----  Subject: Appointment Request    Reason for Call: Established Patient Appointment needed: Routine ED Follow   Up Visit    QUESTIONS    Reason for appointment request? No appointments available during search     Additional Information for Provider? pt was seen in ed room 10/21 and   today 10/24 for tonsils. Pt would need a appointment to follow up   appointment however she is requesting for something soon . Pt is asking for   a follow up but she would like for someone to reach out to her today to   discuss a few things in this concern . Please reach out to get her   scheduled and to discuss . Next available was not until 11/8  ---------------------------------------------------------------------------  --------------  [address] [employer]  [phone]; OK to leave message on voicemail  ---------------------------------------------------------------------------  --------------  SCRIPT ANSWERS  COVID Screen: Devi Stanley

## 2022-10-24 NOTE — Clinical Note
[address] [place] [address] EMERGENCY DEP  [address] [facility] [address] [place] [address]  [phone]    Work/School Note    Date: 10/24/2022    To Whom It May concern:    Annette Mera was seen and treated today in the emergency room by the following provider(s):  Attending Provider: Lisa Zazueta MD.      Annette Mera is excused from work/school on 10/24/22 and 10/25/22. She is medically clear to return to work/school on 10/26/2022. Sincerely,          Cristiano Chi MD

## 2022-10-24 NOTE — ED PROVIDER NOTES
EMERGENCY DEPARTMENT HISTORY AND PHYSICAL EXAM          Date: 10/24/2022  Patient Name: Rona Lizama    History of Presenting Illness     Chief Complaint   Patient presents with    Sore Throat     Tonsil stones       History Provided By: Patient    HPI: Rona Lizama is a 25 y.o. female, without significant history, presents ambulatory with mom for sore throat. After being seen here last week and diagnosed with tonsil stone she states she is not getting better. She is using oral mouthwash was provided but has not started her antibiotic. She states since she is left she has developed fever, swelling in her throat as well as swelling in her neck. She states she can hardly swallow because of the pain. She has been taking Tylenol without difficulty. She denies any vomiting, diarrhea and rash    PCP: Moo Edwards NP    Allergies: Prednisone    There are no other complaints, changes, or physical findings at this time. Current Outpatient Medications   Medication Sig Dispense Refill    chlorhexidine (Peridex) 0.12 % solution 15 mL by Swish and Spit route every twelve (12) hours for 14 days.  420 mL 0    azithromycin (Zithromax Z-Bhavesh) 250 mg tablet Take as directed 6 Tablet 0       Past History     Past Medical History:  Past Medical History:   Diagnosis Date    Allergic rhinitis     Asthma     CHILD    Otitis media     Reactive airway disease     Strep throat     Strep throat        Past Surgical History:  Past Surgical History:   Procedure Laterality Date    HX GYN  09/02/2022    Simple vulvectomy, resection of vulvar cyst       Family History:  Family History   Problem Relation Age of Onset    Headache Brother     Hypertension Maternal Grandfather     Hypertension Paternal Grandfather     Cancer Other     Diabetes Other     Headache Other     Heart Disease Other     Anesth Problems Neg Hx        Social History:  Social History     Tobacco Use    Smoking status: Never    Smokeless tobacco: Former Vaping Use    Vaping Use: Never used   Substance Use Topics    Alcohol use: No    Drug use: No       Allergies: Allergies   Allergen Reactions    Prednisone Rash and Swelling         Review of Systems   Review of Systems   Constitutional:  Positive for fatigue and fever. Negative for activity change, appetite change, chills and unexpected weight change. HENT:  Positive for sore throat and trouble swallowing. Negative for congestion. Eyes:  Negative for pain and visual disturbance. Respiratory:  Negative for cough and shortness of breath. Cardiovascular:  Negative for chest pain. Gastrointestinal:  Negative for abdominal pain, diarrhea, nausea and vomiting. Genitourinary:  Negative for dysuria. Musculoskeletal:  Negative for back pain. Skin:  Negative for rash. Neurological:  Negative for headaches. Physical Exam   Physical Exam  Vitals and nursing note reviewed. Constitutional:       Appearance: She is well-developed. She is ill-appearing. She is not toxic-appearing or diaphoretic. Comments: This is a thin young female appearing uncomfortable with slightly elevated heart rate, in mild acute distress secondary to pain   HENT:      Head: Normocephalic and atraumatic. Mouth/Throat:      Mouth: Mucous membranes are moist.      Pharynx: Posterior oropharyngeal erythema present. No uvula swelling. Tonsils: Tonsillar exudate and tonsillar abscess present. 1+ on the right. 2+ on the left. Comments: Tonsils without evidence of abscess  Eyes:      General:         Right eye: No discharge. Left eye: No discharge. Conjunctiva/sclera: Conjunctivae normal.      Pupils: Pupils are equal, round, and reactive to light. Cardiovascular:      Rate and Rhythm: Normal rate and regular rhythm. Heart sounds: Normal heart sounds. No murmur heard. Pulmonary:      Effort: Pulmonary effort is normal. No respiratory distress. Breath sounds: Normal breath sounds.  No wheezing or rales. Musculoskeletal:         General: Normal range of motion. Cervical back: Normal range of motion and neck supple. Lymphadenopathy:      Cervical: Cervical adenopathy (Left anterior cervical) present. Skin:     General: Skin is warm and dry. Findings: No rash. Neurological:      Mental Status: She is alert and oriented to person, place, and time. Cranial Nerves: No cranial nerve deficit. Motor: No abnormal muscle tone. Diagnostic Study Results     Labs -   No results found for this or any previous visit (from the past 12 hour(s)). Radiologic Studies -   No orders to display     CT Results  (Last 48 hours)      None          CXR Results  (Last 48 hours)      None              Medical Decision Making   I am the first provider for this patient. I reviewed the vital signs, available nursing notes, past medical history, past surgical history, family history and social history. Vital Signs-Reviewed the patient's vital signs. No data found. Pulse Oximetry Analysis - 99% on RA    Records Reviewed: Nursing Notes and Old Medical Records    Provider Notes (Medical Decision Making):   MDM: 14-year-old female presenting afebrile with complaint of worsening sore throat. Subjective fevers at home with difficulty swallowing. She does have what appears to be acute pharyngitis with left cervical lymphadenopathy. Patient already prescribed antibiotics at previous visit so I recommend she go ahead and fill that and start taking them today. Symptomatic medications to be provided for edema and pain. At this time I do not see any evidence of abscess requiring imaging    ED Course:   Initial assessment performed. The patients presenting problems have been discussed, and they are in agreement with the care plan formulated and outlined with them. I have encouraged them to ask questions as they arise throughout their visit.           Discharge note:  Patient appropriate for outpatient management with PCP follow-up. All questions have been answered, pt voiced understanding and agreement with plan. Specific return precautions provided as well as instructions to return to the ED should sx worsen at any time. Vital signs stable for discharge. Critical Care Time:   0      Diagnosis     Clinical Impression:   1. Exudative pharyngitis        PLAN:  1. Discharge Medication List as of 10/24/2022 10:41 AM        CONTINUE these medications which have NOT CHANGED    Details   chlorhexidine (Peridex) 0.12 % solution 15 mL by Swish and Spit route every twelve (12) hours for 14 days. , Normal, Disp-420 mL, R-0      azithromycin (Zithromax Z-Bhavesh) 250 mg tablet Take as directed, Normal, Disp-6 Tablet, R-0           2. Follow-up Information       Follow up With Specialties Details Why Contact Bebo El, NP Nurse Practitioner Schedule an appointment as soon as possible for a visit  fo recheck later this week [id number] [place] [address].      [address] Emergency Medicine  If symptoms worsen [address]  [address] [facility] [phone] [id number]          Return to ED if worse     Disposition:  Home       Please note, this dictation was completed with Delpor, the [employer] voice recognition software. Quite often unanticipated grammatical, syntax, homophones, and other interpretive errors are inadvertently transcribed by the computer software. Please disregard these errors. Please excuse any errors that have escaped final proof reading.

## 2022-10-24 NOTE — ED TRIAGE NOTES
Pt arrived by POV for tonsil stones. Pt reports she was seen on Friday and told she had tonsil stones. Pt reports she is feeling worse with increased pain, swelling and hoarseness.   Pt is awake alert and oriented  x4, pt and mother educated on ER flow

## 2022-10-24 NOTE — Clinical Note
Nicki Falk was seen and treated in our emergency department on 10/24/2022.     She was accompanied by her mother    Morgan Ramirez MD

## 2022-10-24 NOTE — ED NOTES
RN went to give pt the decadron and pt stated that a few months ago she had a reaction to steroids when they gave it to her. Pt thought that it was because they gave it to her for the wrong reason. Pt educated on importance of reporting all reactions to medical staff and that allergies reactions occur d/t medications and not because they were given for the wrong reasons. MD notified of pt's allergy. Decadron discontinued.

## 2023-01-03 ENCOUNTER — HOSPITAL ENCOUNTER (EMERGENCY)
Age: 23
Discharge: HOME | End: 2023-01-03
Attending: EMERGENCY MEDICINE
Payer: MEDICAID

## 2023-01-03 VITALS
HEART RATE: 70 BPM | BODY MASS INDEX: 25.69 KG/M2 | WEIGHT: 145 LBS | TEMPERATURE: 97.6 F | HEIGHT: 63 IN | DIASTOLIC BLOOD PRESSURE: 72 MMHG | OXYGEN SATURATION: 99 % | SYSTOLIC BLOOD PRESSURE: 116 MMHG | RESPIRATION RATE: 18 BRPM

## 2023-01-03 DIAGNOSIS — N94.6 DYSMENORRHEA: Primary | ICD-10-CM

## 2023-01-03 LAB
APPEARANCE UR: ABNORMAL
BACTERIA URNS QL MICRO: ABNORMAL /HPF
BILIRUB UR QL: NEGATIVE
COLOR UR: ABNORMAL
EPITH CASTS URNS QL MICRO: ABNORMAL /LPF
GLUCOSE UR STRIP.AUTO-MCNC: 100 MG/DL
HCG UR QL: NEGATIVE
HGB UR QL STRIP: ABNORMAL
KETONES UR QL STRIP.AUTO: 15 MG/DL
LEUKOCYTE ESTERASE UR QL STRIP.AUTO: NEGATIVE
NITRITE UR QL STRIP.AUTO: NEGATIVE
PH UR STRIP: 6 [PH] (ref 5–8)
PROT UR STRIP-MCNC: ABNORMAL MG/DL
RBC #/AREA URNS HPF: ABNORMAL /HPF (ref 0–5)
SP GR UR REFRACTOMETRY: 1.02 (ref 1–1.03)
UROBILINOGEN UR QL STRIP.AUTO: 0.2 EU/DL (ref 0.2–1)
WBC URNS QL MICRO: ABNORMAL /HPF (ref 0–4)

## 2023-01-03 PROCEDURE — 81001 URINALYSIS AUTO W/SCOPE: CPT

## 2023-01-03 PROCEDURE — 96374 THER/PROPH/DIAG INJ IV PUSH: CPT

## 2023-01-03 PROCEDURE — 81025 URINE PREGNANCY TEST: CPT

## 2023-01-03 PROCEDURE — 96376 TX/PRO/DX INJ SAME DRUG ADON: CPT

## 2023-01-03 PROCEDURE — 99284 EMERGENCY DEPT VISIT MOD MDM: CPT

## 2023-01-03 PROCEDURE — 96375 TX/PRO/DX INJ NEW DRUG ADDON: CPT

## 2023-01-03 PROCEDURE — 74011250636 HC RX REV CODE- 250/636: Performed by: EMERGENCY MEDICINE

## 2023-01-03 RX ORDER — ONDANSETRON 2 MG/ML
4 INJECTION INTRAMUSCULAR; INTRAVENOUS
Status: COMPLETED | OUTPATIENT
Start: 2023-01-03 | End: 2023-01-03

## 2023-01-03 RX ORDER — KETOROLAC TROMETHAMINE 15 MG/ML
15 INJECTION, SOLUTION INTRAMUSCULAR; INTRAVENOUS
Status: COMPLETED | OUTPATIENT
Start: 2023-01-03 | End: 2023-01-03

## 2023-01-03 RX ORDER — ONDANSETRON 4 MG/1
4 TABLET, ORALLY DISINTEGRATING ORAL
Qty: 12 TABLET | Refills: 0 | Status: SHIPPED | OUTPATIENT
Start: 2023-01-03

## 2023-01-03 RX ORDER — IBUPROFEN 600 MG/1
600 TABLET ORAL
Qty: 20 TABLET | Refills: 0 | Status: SHIPPED | OUTPATIENT
Start: 2023-01-03

## 2023-01-03 RX ADMIN — KETOROLAC TROMETHAMINE 15 MG: 15 INJECTION, SOLUTION INTRAMUSCULAR; INTRAVENOUS at 10:06

## 2023-01-03 RX ADMIN — ONDANSETRON 4 MG: 2 INJECTION INTRAMUSCULAR; INTRAVENOUS at 10:18

## 2023-01-03 RX ADMIN — ONDANSETRON 4 MG: 2 INJECTION INTRAMUSCULAR; INTRAVENOUS at 08:59

## 2023-01-03 RX ADMIN — SODIUM CHLORIDE 1000 ML: 9 INJECTION, SOLUTION INTRAVENOUS at 09:00

## 2023-01-03 NOTE — Clinical Note
[address] [place] [address] EMERGENCY DEP  [address] [place] [address] Dr Marcos Philippe [id number]  [phone]    Work/School Note    Date: 1/3/2023    To Whom It May concern:      Chelsea Hannon was seen and treated today in the emergency room by the following provider(s):  Attending Provider: Brandon Adame MD.      Chelsea Hannon is excused from work/school on 01/03/23. She is clear to return to work/school on 01/04/23.         Sincerely,          Thea Hinds MD

## 2023-01-03 NOTE — Clinical Note
[address] [place] [address] EMERGENCY DEP  [address] [facility] [address] Lakia Morin [address]  [phone]    Work/School Note    Date: 1/3/2023    To Whom It May concern:    Keyla Nicole was seen and treated today in the emergency room by the following provider(s):  Attending Provider: Neo Lees MD.      Keyla Nicole is excused from work/school on 01/03/23 and 01/04/23. She is medically clear to return to work/school on 1/5/2023.        Sincerely,          Carmen Villanueva MD

## 2023-01-03 NOTE — ED PROVIDER NOTES
EMERGENCY DEPARTMENT HISTORY AND PHYSICAL EXAM          Date: 1/3/2023  Patient Name: Janki Perez    History of Presenting Illness     Chief Complaint   Patient presents with    Vomiting       History Provided By: Patient    HPI: Janki Perez is a 23 y.o. female, pmhx listed below, who presents to the ED c/o menstrual cramps. Patient reports severe menstrual cramps that started today. Associated with nausea and vomiting. Reports she has a history of severe menstrual cramps in the past however has not had a similar episode in several months. No fever. Not tolerating p.o. at home, multiple episodes of emesis. No treatments prior to arrival.        PCP: Yuliya Dhillon NP        Past History       Past Medical History:  Past Medical History:   Diagnosis Date    Allergic rhinitis     Asthma     CHILD    Otitis media     Reactive airway disease     Strep throat     Strep throat        Past Surgical History:  Past Surgical History:   Procedure Laterality Date    HX GYN  09/02/2022    Simple vulvectomy, resection of vulvar cyst       Family History:  Family History   Problem Relation Age of Onset    Headache Brother     Hypertension Maternal Grandfather     Hypertension Paternal Grandfather     Cancer Other     Diabetes Other     Headache Other     Heart Disease Other     Anesth Problems Neg Hx        Social History:  Social History     Tobacco Use    Smoking status: Never    Smokeless tobacco: Former   Vaping Use    Vaping Use: Never used   Substance Use Topics    Alcohol use: No    Drug use: No       Physical Exam     Vital Signs-Reviewed the patient's vital signs. Patient Vitals for the past 12 hrs:   Temp Pulse Resp BP SpO2   01/03/23 0814 97.6 °F (36.4 °C) (!) 56 18 122/77 100 %       Physical Exam  Constitutional:       Comments: Uncomfortable appearing   HENT:      Head: Normocephalic and atraumatic.       Mouth/Throat:      Mouth: Mucous membranes are moist.   Cardiovascular:      Rate and Rhythm: Normal rate and regular rhythm. Pulmonary:      Effort: Pulmonary effort is normal.      Breath sounds: Normal breath sounds. Abdominal:      Tenderness: There is no abdominal tenderness. Musculoskeletal:         General: No swelling. Skin:     General: Skin is warm and dry. Neurological:      Mental Status: She is alert and oriented to person, place, and time. Psychiatric:         Mood and Affect: Mood normal.       Diagnostic Study Results     Labs -     Recent Results (from the past 12 hour(s))   HCG URINE, QL    Collection Time: 01/03/23  9:46 AM   Result Value Ref Range    HCG urine, QL Negative NEG     URINALYSIS W/ RFLX MICROSCOPIC    Collection Time: 01/03/23  9:48 AM   Result Value Ref Range    Color YELLOW/STRAW      Appearance HAZY (A) CLEAR      Specific gravity 1.025 1.003 - 1.030      pH (UA) 6.0 5.0 - 8.0      Protein TRACE (A) NEG mg/dL    Glucose 100 (A) NEG mg/dL    Ketone 15 (A) NEG mg/dL    Bilirubin Negative NEG      Blood LARGE (A) NEG      Urobilinogen 0.2 0.2 - 1.0 EU/dL    Nitrites Negative NEG      Leukocyte Esterase Negative NEG     URINE MICROSCOPIC ONLY    Collection Time: 01/03/23  9:48 AM   Result Value Ref Range    WBC 5-10 0 - 4 /hpf    RBC  0 - 5 /hpf    Epithelial cells FEW FEW /lpf    Bacteria 1+ (A) NEG /hpf       Radiologic Studies -   No orders to display     CT Results  (Last 48 hours)      None          CXR Results  (Last 48 hours)      None                Medical Decision Making   I am the first provider for this patient. I reviewed the vital signs, available nursing notes, past medical history, past surgical history, family history and social history. Records Reviewed:   Nursing Notes and Old Medical Records  Provider Notes (Medical Decision Making):   MDM: 57-year-old female with report of menstrual cramps similar to prior episodes. Concern for dysmenorrhea. Will check pregnancy to ensure no chance of miscarriage versus ectopic pregnancy.   Plan for symptomatic treatment now. If no improvement may need further work-up for alternate causes of symptoms. Initial assessment performed. The patients presenting problems have been discussed, and they are in agreement with the care plan formulated and outlined with them. I have encouraged them to ask questions as they arise throughout their visit. PROGRESS NOTE:  Patient now feeling improved, no longer nauseated. Dramatic improvement after Toradol. Will discharge home with prescription for ibuprofen to be taken regularly over the next few days. Zofran for symptomatic relief. Not pregnant. Discharge note:  Pt re-evaluated and noted to be feeling better, ready for discharge. Updated pt on all final results. Will follow up as instructed. All questions have been answered, pt voiced understanding and agreement with plan. Specific return precautions provided as well as instructions to return to the ED should sx worsen at any time. Vital signs stable for discharge. Diagnosis     Clinical Impression:   1. Dysmenorrhea            Disposition:  Discharged    Current Discharge Medication List        START taking these medications    Details   ibuprofen (MOTRIN) 600 mg tablet Take 1 Tablet by mouth every six (6) hours as needed for Pain. Qty: 20 Tablet, Refills: 0  Start date: 1/3/2023      ondansetron (ZOFRAN ODT) 4 mg disintegrating tablet Take 1 Tablet by mouth every eight (8) hours as needed for Nausea or Vomiting. Qty: 12 Tablet, Refills: 0  Start date: 1/3/2023               Please note, this dictation was completed with Control[employer], the [employer] voice recognition software. Quite often unanticipated grammatical, syntax, homophones, and other interpretive errors are inadvertently transcribed by the computer software. Please disregard these errors. Please excuse any errors that have escaped final proof reading.

## 2023-01-03 NOTE — ED TRIAGE NOTES
Pt c/o abd discomfort, nausea vomiting, diarrhea, bleeding that started this morning. Pt did start her menstrual cycle today but has never felt this bad with one of her cycles.

## 2023-01-24 ENCOUNTER — OFFICE VISIT (OUTPATIENT)
Dept: GYNECOLOGY | Age: 23
End: 2023-01-24
Payer: MEDICAID

## 2023-01-24 VITALS
SYSTOLIC BLOOD PRESSURE: 109 MMHG | HEIGHT: 63 IN | WEIGHT: 124.2 LBS | HEART RATE: 76 BPM | DIASTOLIC BLOOD PRESSURE: 64 MMHG | BODY MASS INDEX: 22.01 KG/M2

## 2023-01-24 DIAGNOSIS — N90.89 VULVAR LESION: Primary | ICD-10-CM

## 2023-01-24 PROCEDURE — 99213 OFFICE O/P EST LOW 20 MIN: CPT | Performed by: OBSTETRICS & GYNECOLOGY

## 2023-01-24 NOTE — PROGRESS NOTES
2 month follow up for vulvar lesion, pt states she may have a yeast infection, vaginal discharge, white in color, no odor, pt's states her weight loss has been unintentional, she states that her appetite has not been the same since she was on antibiotics for the cyst last summer    1. Have you been to the ER, urgent care clinic since your last visit? Hospitalized since your last visit? Yes to ER on 1/3/23 for vomiting, 10/24/22 and 10/21/22 for sore throat    2. Have you seen or consulted any other health care providers outside of the [address] [place] [address] since your last visit? Include any pap smears or colon screening.      no

## 2023-01-24 NOTE — PROGRESS NOTES
[address] [facility] [address], [address] [facility]  P [phone]  F [phone]    Office Note  Patient ID:  Name:  Danelle Lowery  MRN:  [id number]  :  2000/22 y.o. Date:  2023      HISTORY OF PRESENT ILLNESS:  Ms. Danelle Lowery is a 23 y.o. female who presented with a vulvar lesion. On 2022 underwent simple vulvectomy, resection of vulvar cyst. Final pathology benign: acute and chronic inflammation involving fragments of skin and squmous mucosal lined cyst wall. Doing well since surgery. No issues today. Interval History  Ms. Danelle Lowery is a 23 y.o. female who presents as a new patient from Dr. Madison Quijano for vulvar mass. Dr. Madison Quijano called me last week regarding this patient with a vulvar lesion/abscess that contained sebum and hair like a dermoid cyst or pilonidal cyst. The patient reports this has been there for about 6 months. She reports it grows and then drains and then grows again. She has an infection a couple of times and is currently on antibiotics. Denies fevers or chills. Reports she has always had an indention in this area and a little knot. She has noticed this for years. Pathology Review:   2022:  * * *FINAL PATHOLOGIC DIAGNOSIS* * *   1.  Vulva, partial vulvectomy:        Acute and chronic inflammation involving adnexal structures including   hair follicles. 2.  Cyst wall, vulva:         Fragments of skin and squamous mucosal lined cyst wall  3. Cyst contents, vulva:        Fragments of acellular debris consistent with cyst contents and few   hair follicles. ROS:  A comprehensive review of systems was negative except for that written in the History of Present Illness.  , 10 point ROS      ECOG stGstrstastdstest:st st1st Problem List:  Patient Active Problem List    Diagnosis Date Noted    Vulvar lesion 2022    Nummular dermatitis 2020    Chlamydia infection 2019    Weight gain 2018     PMH:  Past Medical History:   Diagnosis Date    Allergic rhinitis     Asthma     CHILD    Otitis media     Reactive airway disease     Strep throat     Strep throat       PSH:  Past Surgical History:   Procedure Laterality Date    HX GYN  09/02/2022    Simple vulvectomy, resection of vulvar cyst      Social History:  Social History     Tobacco Use    Smoking status: Never    Smokeless tobacco: Former   Substance Use Topics    Alcohol use: No      Family History:  Family History   Problem Relation Age of Onset    Headache Brother     Hypertension Maternal Grandfather     Hypertension Paternal Grandfather     Cancer Other     Diabetes Other     Headache Other     Heart Disease Other     Anesth Problems Neg Hx       Medications: (reviewed)  Current Outpatient Medications   Medication Sig    ibuprofen (MOTRIN) 600 mg tablet Take 1 Tablet by mouth every six (6) hours as needed for Pain. (Patient not taking: Reported on 1/24/2023)    ondansetron (ZOFRAN ODT) 4 mg disintegrating tablet Take 1 Tablet by mouth every eight (8) hours as needed for Nausea or Vomiting. (Patient not taking: Reported on 1/24/2023)    azithromycin (Zithromax Z-Bhavesh) 250 mg tablet Take as directed (Patient not taking: Reported on 1/24/2023)     No current facility-administered medications for this visit. Allergies: (reviewed)  Allergies   Allergen Reactions    Prednisone Rash and Swelling          OBJECTIVE:  Physical Exam:  Visit Vitals  /64 (BP 1 Location: Left upper arm, BP Patient Position: Sitting)   Pulse 76   Ht 5' 3\" (1.6 m)   Wt 124 lb 3.2 oz (56.3 kg)   LMP 01/05/2023 (Exact Date)   BMI 22.00 kg/m²      General: Alert and oriented. No acute distress. Well-nourished  Musculoskeletal: normal gait. No joint tenderness, deformity or swelling. No muscular tenderness. Extremities: extremities normal, atraumatic, no cyanosis or edema. Pelvic: exam chaperoned by nurse. Normal appearing external genitalia. Her incision has healed well.   Patient reported vaginal discharge, but declined vaginal/speculum exam.   Neuro: Grossly intact. Normal gait and movement. No acute deficit  Skin: No evidence of rashes or skin changes. IMPRESSION/PLAN:    Ms. Comfort Pratt is a 23 y.o. female who presented with a vulvar lesion. On 9/2/2022 underwent simple vulvectomy, resection of vulvar cyst. Final pathology benign: acute and chronic inflammation involving fragments of skin and squmous mucosal lined cyst wall. Problems:     Patient Active Problem List    Diagnosis Date Noted    Vulvar lesion 09/02/2022    Nummular dermatitis 09/25/2020    Chlamydia infection 07/17/2019    Weight gain 05/22/2018       Reviewed patient's course to date, including her benign surgical pathology. She has healed well from surgery. She may return to Dr. Aliya Dodson for routine gynecologic care. All questions and concerns were addressed with the patient and she is comfortable with the plan. An electronic signature was used to authenticate this note.      Murray Sepulveda MD

## 2023-02-06 ENCOUNTER — LAB ONLY (OUTPATIENT)
Dept: FAMILY MEDICINE CLINIC | Age: 23
End: 2023-02-06
Payer: MEDICAID

## 2023-02-06 DIAGNOSIS — Z11.1 TUBERCULOSIS SCREENING: Primary | ICD-10-CM

## 2023-02-06 PROCEDURE — 86580 TB INTRADERMAL TEST: CPT | Performed by: NURSE PRACTITIONER

## 2024-01-24 ENCOUNTER — HOSPITAL ENCOUNTER (EMERGENCY)
Facility: HOSPITAL | Age: 24
Discharge: HOME | End: 2024-01-24
Attending: EMERGENCY MEDICINE
Payer: MEDICAID

## 2024-01-24 ENCOUNTER — APPOINTMENT (OUTPATIENT)
Facility: HOSPITAL | Age: 24
End: 2024-01-24
Payer: MEDICAID

## 2024-01-24 VITALS
DIASTOLIC BLOOD PRESSURE: 47 MMHG | BODY MASS INDEX: 20.91 KG/M2 | RESPIRATION RATE: 22 BRPM | HEIGHT: 63 IN | OXYGEN SATURATION: 99 % | HEART RATE: 60 BPM | TEMPERATURE: 98.8 F | WEIGHT: 118 LBS | SYSTOLIC BLOOD PRESSURE: 99 MMHG

## 2024-01-24 DIAGNOSIS — R00.2 PALPITATIONS: ICD-10-CM

## 2024-01-24 DIAGNOSIS — E87.6 HYPOKALEMIA: ICD-10-CM

## 2024-01-24 DIAGNOSIS — R07.89 ATYPICAL CHEST PAIN: Primary | ICD-10-CM

## 2024-01-24 DIAGNOSIS — R00.0 SINUS TACHYCARDIA: ICD-10-CM

## 2024-01-24 LAB
ALBUMIN SERPL-MCNC: 4.2 G/DL (ref 3.5–5)
ALBUMIN/GLOB SERPL: 1.3 (ref 1.1–2.2)
ALP SERPL-CCNC: 49 U/L (ref 45–117)
ALT SERPL-CCNC: 14 U/L (ref 12–78)
AMPHET UR QL SCN: NEGATIVE
ANION GAP SERPL CALC-SCNC: 12 MMOL/L (ref 5–15)
AST SERPL-CCNC: 11 U/L (ref 15–37)
BARBITURATES UR QL SCN: NEGATIVE
BASOPHILS # BLD: 0 K/UL (ref 0–0.1)
BASOPHILS NFR BLD: 0 % (ref 0–1)
BENZODIAZ UR QL: NEGATIVE
BILIRUB SERPL-MCNC: 0.8 MG/DL (ref 0.2–1)
BUN SERPL-MCNC: 11 MG/DL (ref 6–20)
BUN/CREAT SERPL: 13 (ref 12–20)
CALCIUM SERPL-MCNC: 8.9 MG/DL (ref 8.5–10.1)
CANNABINOIDS UR QL SCN: POSITIVE
CHLORIDE SERPL-SCNC: 106 MMOL/L (ref 97–108)
CO2 SERPL-SCNC: 25 MMOL/L (ref 21–32)
COCAINE UR QL SCN: NEGATIVE
CREAT SERPL-MCNC: 0.85 MG/DL (ref 0.55–1.02)
D DIMER PPP FEU-MCNC: 0.37 MG/L FEU (ref 0–0.65)
DIFFERENTIAL METHOD BLD: ABNORMAL
EKG ATRIAL RATE: 120 BPM
EKG DIAGNOSIS: NORMAL
EKG P AXIS: 81 DEGREES
EKG P-R INTERVAL: 150 MS
EKG Q-T INTERVAL: 286 MS
EKG QRS DURATION: 80 MS
EKG QTC CALCULATION (BAZETT): 404 MS
EKG R AXIS: 24 DEGREES
EKG T AXIS: 42 DEGREES
EKG VENTRICULAR RATE: 120 BPM
EOSINOPHIL # BLD: 0.1 K/UL (ref 0–0.4)
EOSINOPHIL NFR BLD: 1 % (ref 0–7)
ERYTHROCYTE [DISTWIDTH] IN BLOOD BY AUTOMATED COUNT: 12.9 % (ref 11.5–14.5)
GLOBULIN SER CALC-MCNC: 3.2 G/DL (ref 2–4)
GLUCOSE SERPL-MCNC: 115 MG/DL (ref 65–100)
HCG UR QL: NEGATIVE
HCT VFR BLD AUTO: 35.9 % (ref 35–47)
HGB BLD-MCNC: 11.7 G/DL (ref 11.5–16)
IMM GRANULOCYTES # BLD AUTO: 0 K/UL (ref 0–0.04)
IMM GRANULOCYTES NFR BLD AUTO: 0 % (ref 0–0.5)
LYMPHOCYTES # BLD: 4.1 K/UL (ref 0.8–3.5)
LYMPHOCYTES NFR BLD: 51 % (ref 12–49)
Lab: ABNORMAL
MAGNESIUM SERPL-MCNC: 1.7 MG/DL (ref 1.6–2.4)
MCH RBC QN AUTO: 28.3 PG (ref 26–34)
MCHC RBC AUTO-ENTMCNC: 32.6 G/DL (ref 30–36.5)
MCV RBC AUTO: 86.9 FL (ref 80–99)
METHADONE UR QL: NEGATIVE
MONOCYTES # BLD: 0.7 K/UL (ref 0–1)
MONOCYTES NFR BLD: 9 % (ref 5–13)
NEUTS SEG # BLD: 3.2 K/UL (ref 1.8–8)
NEUTS SEG NFR BLD: 38 % (ref 32–75)
NRBC # BLD: 0 K/UL (ref 0–0.01)
NRBC BLD-RTO: 0 PER 100 WBC
OPIATES UR QL: NEGATIVE
PCP UR QL: NEGATIVE
PLATELET # BLD AUTO: 234 K/UL (ref 150–400)
PMV BLD AUTO: 9.1 FL (ref 8.9–12.9)
POTASSIUM SERPL-SCNC: 3.3 MMOL/L (ref 3.5–5.1)
PROT SERPL-MCNC: 7.4 G/DL (ref 6.4–8.2)
RBC # BLD AUTO: 4.13 M/UL (ref 3.8–5.2)
SODIUM SERPL-SCNC: 143 MMOL/L (ref 136–145)
TROPONIN I SERPL HS-MCNC: 4 NG/L (ref 0–51)
TSH SERPL DL<=0.05 MIU/L-ACNC: 2.75 UIU/ML (ref 0.36–3.74)
WBC # BLD AUTO: 8.2 K/UL (ref 3.6–11)

## 2024-01-24 PROCEDURE — 80307 DRUG TEST PRSMV CHEM ANLYZR: CPT

## 2024-01-24 PROCEDURE — 80053 COMPREHEN METABOLIC PANEL: CPT

## 2024-01-24 PROCEDURE — 36415 COLL VENOUS BLD VENIPUNCTURE: CPT

## 2024-01-24 PROCEDURE — 84484 ASSAY OF TROPONIN QUANT: CPT

## 2024-01-24 PROCEDURE — 81025 URINE PREGNANCY TEST: CPT

## 2024-01-24 PROCEDURE — 85379 FIBRIN DEGRADATION QUANT: CPT

## 2024-01-24 PROCEDURE — 96361 HYDRATE IV INFUSION ADD-ON: CPT

## 2024-01-24 PROCEDURE — 6370000000 HC RX 637 (ALT 250 FOR IP): Performed by: EMERGENCY MEDICINE

## 2024-01-24 PROCEDURE — 99285 EMERGENCY DEPT VISIT HI MDM: CPT

## 2024-01-24 PROCEDURE — 93005 ELECTROCARDIOGRAM TRACING: CPT | Performed by: EMERGENCY MEDICINE

## 2024-01-24 PROCEDURE — 83735 ASSAY OF MAGNESIUM: CPT

## 2024-01-24 PROCEDURE — 85025 COMPLETE CBC W/AUTO DIFF WBC: CPT

## 2024-01-24 PROCEDURE — 2580000003 HC RX 258: Performed by: EMERGENCY MEDICINE

## 2024-01-24 PROCEDURE — 84443 ASSAY THYROID STIM HORMONE: CPT

## 2024-01-24 PROCEDURE — 71046 X-RAY EXAM CHEST 2 VIEWS: CPT

## 2024-01-24 PROCEDURE — 96360 HYDRATION IV INFUSION INIT: CPT

## 2024-01-24 RX ORDER — POTASSIUM CHLORIDE 750 MG/1
40 TABLET, FILM COATED, EXTENDED RELEASE ORAL ONCE
Status: COMPLETED | OUTPATIENT
Start: 2024-01-24 | End: 2024-01-24

## 2024-01-24 RX ORDER — 0.9 % SODIUM CHLORIDE 0.9 %
1000 INTRAVENOUS SOLUTION INTRAVENOUS ONCE
Status: COMPLETED | OUTPATIENT
Start: 2024-01-24 | End: 2024-01-24

## 2024-01-24 RX ADMIN — SODIUM CHLORIDE 1000 ML: 9 INJECTION, SOLUTION INTRAVENOUS at 01:12

## 2024-01-24 RX ADMIN — POTASSIUM CHLORIDE 40 MEQ: 750 TABLET, FILM COATED, EXTENDED RELEASE ORAL at 02:09

## 2024-01-24 ASSESSMENT — LIFESTYLE VARIABLES
HOW MANY STANDARD DRINKS CONTAINING ALCOHOL DO YOU HAVE ON A TYPICAL DAY: PATIENT DOES NOT DRINK
HOW OFTEN DO YOU HAVE A DRINK CONTAINING ALCOHOL: NEVER

## 2024-01-24 ASSESSMENT — PAIN - FUNCTIONAL ASSESSMENT
PAIN_FUNCTIONAL_ASSESSMENT: NONE - DENIES PAIN
PAIN_FUNCTIONAL_ASSESSMENT: NONE - DENIES PAIN

## 2024-01-24 NOTE — ED PROVIDER NOTES
[facility] EMERGENCY DEP  EMERGENCY DEPARTMENT ENCOUNTER       Pt Name: Martell Mcknight  MRN: [id number]  Birthdate 2000  Date of evaluation: 1/24/2024  Provider: Jennifer Lovett MD   PCP: Jacinda Álvarez APRN - NP  Note Started: 2:37 AM EST 1/24/24     CHIEF COMPLAINT       Chief Complaint   Patient presents with    Shortness of Breath    Chest Pain        HISTORY OF PRESENT ILLNESS: 1 or more elements      History From: Patient  HPI Limitations: None     Martell Mcknight is a 23 y.o. female with no significant past medical history who presents to the emergency room with chief complaint of palpitations and chest discomfort.  Patient reports she was lying down in bed trying to go to sleep when she had a \"quench in her chest\" that she describes as a sharp squeezing feeling.  She sat up and felt very short of breath.  Since then she has had some shortness of breath and palpitations.  Denies feeling anxious.  States her legs felt tingly and numb during the episode.  She denies any alcohol or drug use, specifically denying marijuana use.  Denies any cigarette smoking.  She is not on oral contraceptives.  Denies any recent travel.  Denies any leg swelling..    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     REVIEW OF SYSTEMS      Review of Systems     Positives and Pertinent negatives as per HPI.    PAST HISTORY     Past Medical History:  Past Medical History:   Diagnosis Date    Allergic rhinitis     Asthma     CHILD    Otitis media     Reactive airway disease     Strep throat     Strep throat          Past Surgical History:  Past Surgical History:   Procedure Laterality Date    GYN  09/02/2022    Simple vulvectomy, resection of vulvar cyst       Family History:  Family History   Problem Relation Age of Onset    Diabetes Other     Headache Other     Cancer Other     Hypertension Paternal Grandfather     Hypertension Maternal Grandfather     Headache Brother     Anesth Problems Neg Hx     Heart

## 2024-01-24 NOTE — DISCHARGE INSTRUCTIONS
You were seen in the emergency department for a rapid heart rate and chest discomfort.  Your heart rhythm was fast when you arrived, but it was what we call a normal sinus rhythm.  The rate improved when we gave you fluids.  We checked labs which showed your potassium was slightly low.  Your heart enzymes were normal suggesting that there was no damage to your heart muscle.  We did a lab test to screen for a blood clot in your lung and that came back negative.  Your heart rate was 72 at the end of your emergency department visit.  Please make sure that you avoid alcohol, marijuana, caffeine, and over-the-counter decongestants which can all cause your heart rate to be faster.  Please follow-up with your usual doctor.

## 2024-01-24 NOTE — ED TRIAGE NOTES
Pt reports that she was laying in bed trying to go to sleep and developed mid chest squeezing with a rapid heart rate  and SOB.  Denies cough, recent illness, fever, n/v/d.

## 2024-04-09 ENCOUNTER — OFFICE VISIT (OUTPATIENT)
Age: 24
End: 2024-04-09
Payer: COMMERCIAL

## 2024-04-09 VITALS
RESPIRATION RATE: 22 BRPM | OXYGEN SATURATION: 97 % | HEART RATE: 107 BPM | SYSTOLIC BLOOD PRESSURE: 97 MMHG | HEIGHT: 63 IN | DIASTOLIC BLOOD PRESSURE: 60 MMHG | BODY MASS INDEX: 21.44 KG/M2 | WEIGHT: 121 LBS | TEMPERATURE: 98.4 F

## 2024-04-09 DIAGNOSIS — L73.9 FOLLICULITIS: Primary | ICD-10-CM

## 2024-04-09 DIAGNOSIS — Z86.14 HISTORY OF MRSA INFECTION: ICD-10-CM

## 2024-04-09 PROCEDURE — 99213 OFFICE O/P EST LOW 20 MIN: CPT | Performed by: FAMILY MEDICINE

## 2024-04-09 RX ORDER — DOXYCYCLINE HYCLATE 100 MG
100 TABLET ORAL 2 TIMES DAILY
Qty: 60 TABLET | Refills: 11 | Status: SHIPPED | OUTPATIENT
Start: 2024-04-09

## 2024-04-09 SDOH — ECONOMIC STABILITY: FOOD INSECURITY: WITHIN THE PAST 12 MONTHS, YOU WORRIED THAT YOUR FOOD WOULD RUN OUT BEFORE YOU GOT MONEY TO BUY MORE.: NEVER TRUE

## 2024-04-09 SDOH — ECONOMIC STABILITY: FOOD INSECURITY: WITHIN THE PAST 12 MONTHS, THE FOOD YOU BOUGHT JUST DIDN'T LAST AND YOU DIDN'T HAVE MONEY TO GET MORE.: NEVER TRUE

## 2024-04-09 SDOH — ECONOMIC STABILITY: HOUSING INSECURITY
IN THE LAST 12 MONTHS, WAS THERE A TIME WHEN YOU DID NOT HAVE A STEADY PLACE TO SLEEP OR SLEPT IN A SHELTER (INCLUDING NOW)?: NO

## 2024-04-09 SDOH — ECONOMIC STABILITY: INCOME INSECURITY: HOW HARD IS IT FOR YOU TO PAY FOR THE VERY BASICS LIKE FOOD, HOUSING, MEDICAL CARE, AND HEATING?: NOT HARD AT ALL

## 2024-04-09 ASSESSMENT — ANXIETY QUESTIONNAIRES
2. NOT BEING ABLE TO STOP OR CONTROL WORRYING: NOT AT ALL
GAD7 TOTAL SCORE: 0
IF YOU CHECKED OFF ANY PROBLEMS ON THIS QUESTIONNAIRE, HOW DIFFICULT HAVE THESE PROBLEMS MADE IT FOR YOU TO DO YOUR WORK, TAKE CARE OF THINGS AT HOME, OR GET ALONG WITH OTHER PEOPLE: NOT DIFFICULT AT ALL
6. BECOMING EASILY ANNOYED OR IRRITABLE: NOT AT ALL
1. FEELING NERVOUS, ANXIOUS, OR ON EDGE: NOT AT ALL
5. BEING SO RESTLESS THAT IT IS HARD TO SIT STILL: NOT AT ALL
7. FEELING AFRAID AS IF SOMETHING AWFUL MIGHT HAPPEN: NOT AT ALL
4. TROUBLE RELAXING: NOT AT ALL
3. WORRYING TOO MUCH ABOUT DIFFERENT THINGS: NOT AT ALL

## 2024-04-09 ASSESSMENT — PATIENT HEALTH QUESTIONNAIRE - PHQ9
1. LITTLE INTEREST OR PLEASURE IN DOING THINGS: NOT AT ALL
SUM OF ALL RESPONSES TO PHQ QUESTIONS 1-9: 0
SUM OF ALL RESPONSES TO PHQ9 QUESTIONS 1 & 2: 0
SUM OF ALL RESPONSES TO PHQ QUESTIONS 1-9: 0
2. FEELING DOWN, DEPRESSED OR HOPELESS: NOT AT ALL

## 2024-04-09 ASSESSMENT — ENCOUNTER SYMPTOMS
RHINORRHEA: 0
FACIAL SWELLING: 0
NAUSEA: 0
SINUS PRESSURE: 0
COLOR CHANGE: 0
CHEST TIGHTNESS: 0
CONSTIPATION: 0
COUGH: 0
BACK PAIN: 0
DIARRHEA: 0
EYE REDNESS: 0
ABDOMINAL PAIN: 0
ABDOMINAL DISTENTION: 0
EYE PAIN: 0
VOICE CHANGE: 0
ANAL BLEEDING: 0
BLOOD IN STOOL: 0
WHEEZING: 0
SORE THROAT: 0
SHORTNESS OF BREATH: 0

## 2024-04-09 NOTE — PROGRESS NOTES
Martell Mcknight is a 23 y.o. female who presents with the following:  Chief Complaint   Patient presents with    Skin Problem     boils       Patient complains of small bumps that developed on her arms and legs and sometimes around the genitalia that then developed painful pustules but never burst but do have a reddened base.  Patient does have a past history of having had a MRSA infection.        Allergies   Allergen Reactions    Prednisone Rash and Swelling       Current Outpatient Medications   Medication Sig Dispense Refill    doxycycline hyclate (VIBRA-TABS) 100 MG tablet Take 1 tablet by mouth 2 times daily 60 tablet 11     No current facility-administered medications for this visit.        Past Medical History:   Diagnosis Date    Allergic rhinitis     Asthma     CHILD    Otitis media     Reactive airway disease     Strep throat     Strep throat        Past Surgical History:   Procedure Laterality Date    GYN  09/02/2022    Simple vulvectomy, resection of vulvar cyst       Family History   Problem Relation Age of Onset    Diabetes Other     Headache Other     Cancer Other     Hypertension Paternal Grandfather     Hypertension Maternal Grandfather     Headache Brother     Anesth Problems Neg Hx     Heart Disease Other        Social History     Socioeconomic History    Marital status: Single     Spouse name: None    Number of children: None    Years of education: None    Highest education level: None   Tobacco Use    Smoking status: Never     Passive exposure: Never    Smokeless tobacco: Former   Vaping Use    Vaping Use: Never used   Substance and Sexual Activity    Alcohol use: No    Drug use: No    Sexual activity: Defer     Birth control/protection: Pill   Social History Narrative         ** Merged History Encounter **     Social Determinants of Health     Financial Resource Strain: Low Risk  (4/9/2024)    Overall Financial Resource Strain (CARDIA)     Difficulty of Paying Living Expenses: Not hard at all

## 2024-04-11 ENCOUNTER — TELEPHONE (OUTPATIENT)
Age: 24
End: 2024-04-11

## 2024-04-11 NOTE — TELEPHONE ENCOUNTER
----- Message from Sharon Polanco sent at 4/11/2024 10:31 AM EDT -----  Subject: Message to Provider    QUESTIONS  Information for Provider? Martell Mcknight called on 04/11 @ 10:17 am. She   would like to schedule an appointment to see Dr. Jacinda Álvarez for a   second opinion on her boils located on her legs & elbows. She did see Dr. Ramon Washington for this issue on 04/09 @ 9:00 am, but would still to be   seen by her assigned PCP. If there are any cancellations or any openings   available, please call back to make this appointment.  ---------------------------------------------------------------------------  --------------  CALL BACK INFO  [phone]; OK to leave message on voicemail  ---------------------------------------------------------------------------  --------------  SCRIPT ANSWERS  Relationship to Patient? Self  Are you having swelling in your throat or face? No  Are you having difficulty breathing? No  Have the symptoms worsened or spread in the last day? No  Are you having fevers (100.4), chills or sweats? No  Have you recently (14 days) seen a provider for this issue? Yes

## 2024-04-19 ENCOUNTER — OFFICE VISIT (OUTPATIENT)
Age: 24
End: 2024-04-19
Payer: COMMERCIAL

## 2024-04-19 VITALS
SYSTOLIC BLOOD PRESSURE: 102 MMHG | TEMPERATURE: 98.3 F | BODY MASS INDEX: 22.22 KG/M2 | WEIGHT: 125.4 LBS | OXYGEN SATURATION: 99 % | DIASTOLIC BLOOD PRESSURE: 60 MMHG | HEART RATE: 61 BPM | HEIGHT: 63 IN | RESPIRATION RATE: 16 BRPM

## 2024-04-19 DIAGNOSIS — L73.9 FOLLICULITIS: Primary | ICD-10-CM

## 2024-04-19 PROCEDURE — 99213 OFFICE O/P EST LOW 20 MIN: CPT | Performed by: NURSE PRACTITIONER

## 2024-04-19 ASSESSMENT — PATIENT HEALTH QUESTIONNAIRE - PHQ9
SUM OF ALL RESPONSES TO PHQ QUESTIONS 1-9: 0
2. FEELING DOWN, DEPRESSED OR HOPELESS: NOT AT ALL
SUM OF ALL RESPONSES TO PHQ9 QUESTIONS 1 & 2: 0
1. LITTLE INTEREST OR PLEASURE IN DOING THINGS: NOT AT ALL
SUM OF ALL RESPONSES TO PHQ QUESTIONS 1-9: 0

## 2024-04-19 NOTE — PROGRESS NOTES
Subjective:     Chief Complaint   Patient presents with    Skin Problem       Martell Mcknight is a 23 y.o. female who is a patient of NELLY Llamas, who presents today with c/o \"boils.\"     She is accompanied by her mother.    She has had them for years. She currently has one that is healing on her left elbow and another one on her right thigh that has healed and left a dark scar. She also has some in her groin area. They will usually come to a head and look like they are filled with pus. She has pictures on her phone of past lesions. They are large pustules. When they drain it has a foul smell. They are painful.     Saw PCP Jacinda Álvarez once and was given a short round of antibiotics. She then saw her OBGYN Dr. De Paz and he had to refer her to a surgeon to have a large one removed.     She then saw Dr Washington who diagnosed her with folliculitis and prescribed her Doxycycline 100mg BID x 1 year but she is a little nervous about taking it and wanted a second opinion before she started taking it. She tends to get very nauseas on antibiotics.    Patient Active Problem List   Diagnosis    Chlamydia infection    Weight gain    Nummular dermatitis    Vulvar lesion    Folliculitis    History of MRSA infection       Past Medical History:   Diagnosis Date    Allergic rhinitis     Asthma     CHILD    Otitis media     Reactive airway disease     Strep throat     Strep throat          Current Outpatient Medications:     doxycycline hyclate (VIBRA-TABS) 100 MG tablet, Take 1 tablet by mouth 2 times daily, Disp: 60 tablet, Rfl: 11    Allergies   Allergen Reactions    Prednisone Rash and Swelling       Past Surgical History:   Procedure Laterality Date    GYN  09/02/2022    Simple vulvectomy, resection of vulvar cyst       Social History     Tobacco Use   Smoking Status Never    Passive exposure: Never   Smokeless Tobacco Former       Social History     Socioeconomic History    Marital status: Single     Spouse name: None

## 2025-08-06 ENCOUNTER — OFFICE VISIT (OUTPATIENT)
Dept: FAMILY MEDICINE CLINIC | Age: 25
End: 2025-08-06
Payer: COMMERCIAL

## 2025-08-06 VITALS
WEIGHT: 152.2 LBS | HEIGHT: 63 IN | HEART RATE: 78 BPM | TEMPERATURE: 97.3 F | BODY MASS INDEX: 26.97 KG/M2 | DIASTOLIC BLOOD PRESSURE: 58 MMHG | RESPIRATION RATE: 18 BRPM | SYSTOLIC BLOOD PRESSURE: 108 MMHG | OXYGEN SATURATION: 98 %

## 2025-08-06 DIAGNOSIS — R59.0 CERVICAL LYMPHADENOPATHY: Primary | ICD-10-CM

## 2025-08-06 PROCEDURE — 99213 OFFICE O/P EST LOW 20 MIN: CPT

## 2025-08-06 SDOH — ECONOMIC STABILITY: FOOD INSECURITY: WITHIN THE PAST 12 MONTHS, YOU WORRIED THAT YOUR FOOD WOULD RUN OUT BEFORE YOU GOT MONEY TO BUY MORE.: NEVER TRUE

## 2025-08-06 SDOH — ECONOMIC STABILITY: FOOD INSECURITY: WITHIN THE PAST 12 MONTHS, THE FOOD YOU BOUGHT JUST DIDN'T LAST AND YOU DIDN'T HAVE MONEY TO GET MORE.: NEVER TRUE

## 2025-08-06 SDOH — ECONOMIC STABILITY: TRANSPORTATION INSECURITY
IN THE PAST 12 MONTHS, HAS LACK OF TRANSPORTATION KEPT YOU FROM MEETINGS, WORK, OR FROM GETTING THINGS NEEDED FOR DAILY LIVING?: NO

## 2025-08-06 SDOH — ECONOMIC STABILITY: TRANSPORTATION INSECURITY
IN THE PAST 12 MONTHS, HAS THE LACK OF TRANSPORTATION KEPT YOU FROM MEDICAL APPOINTMENTS OR FROM GETTING MEDICATIONS?: NO

## 2025-08-06 SDOH — ECONOMIC STABILITY: INCOME INSECURITY: IN THE LAST 12 MONTHS, WAS THERE A TIME WHEN YOU WERE NOT ABLE TO PAY THE MORTGAGE OR RENT ON TIME?: NO

## 2025-08-06 ASSESSMENT — PATIENT HEALTH QUESTIONNAIRE - PHQ9
SUM OF ALL RESPONSES TO PHQ QUESTIONS 1-9: 0
1. LITTLE INTEREST OR PLEASURE IN DOING THINGS: NOT AT ALL
2. FEELING DOWN, DEPRESSED OR HOPELESS: NOT AT ALL
SUM OF ALL RESPONSES TO PHQ QUESTIONS 1-9: 0

## 2025-08-13 ENCOUNTER — HOSPITAL ENCOUNTER (OUTPATIENT)
Facility: HOSPITAL | Age: 25
Discharge: HOME | End: 2025-08-16
Payer: COMMERCIAL

## 2025-08-13 DIAGNOSIS — R59.0 CERVICAL LYMPHADENOPATHY: ICD-10-CM

## 2025-08-13 PROCEDURE — 76536 US EXAM OF HEAD AND NECK: CPT

## 2025-08-20 ENCOUNTER — RESULTS FOLLOW-UP (OUTPATIENT)
Dept: FAMILY MEDICINE CLINIC | Age: 25
End: 2025-08-20

## (undated) DEVICE — SHEET,DRAPE,UNDERBUTTOCK,GRAD POUCH,PORT: Brand: MEDLINE

## (undated) DEVICE — ELECTRODE NDL 2.8IN COAT VALLEYLAB

## (undated) DEVICE — TUBING, SUCTION, 1/4" X 12', STRAIGHT: Brand: MEDLINE

## (undated) DEVICE — LEGGINGS, PAIR, 31X48, STERILE: Brand: MEDLINE

## (undated) DEVICE — BASIN SET MIN W/O CAUT PNCL --

## (undated) DEVICE — GOWN,SIRUS,NONRNF,SETINSLV,XL,20/CS: Brand: MEDLINE

## (undated) DEVICE — REM POLYHESIVE ADULT PATIENT RETURN ELECTRODE: Brand: VALLEYLAB

## (undated) DEVICE — PREMIUM WET SKIN PREP TRAY: Brand: MEDLINE INDUSTRIES, INC.

## (undated) DEVICE — GLOVE SURG SZ 6 THK91MIL LTX FREE SYN POLYISOPRENE ANTI

## (undated) DEVICE — SUTURE VCRL SZ 2-0 L27IN ABSRB UD L26MM SH 1/2 CIR J417H

## (undated) DEVICE — GLOVE ORANGE PI 7   MSG9070

## (undated) DEVICE — GARMENT,MEDLINE,DVT,INT,CALF,MED, GEN2: Brand: MEDLINE

## (undated) DEVICE — SYR 10ML LUER LOK 1/5ML GRAD --

## (undated) DEVICE — HYPODERMIC SAFETY NEEDLE: Brand: MAGELLAN

## (undated) DEVICE — GLOVE SURG SZ 6 L12IN FNGR THK79MIL GRN LTX FREE

## (undated) DEVICE — SYRINGE IRRIG 60ML SFT PLIABLE BLB EZ TO GRP 1 HND USE W/

## (undated) DEVICE — DRAPE,LITHOTOMY,STERILE: Brand: MEDLINE

## (undated) DEVICE — YANKAUER,TAPERED BULBOUS TIP,W/O VENT: Brand: MEDLINE